# Patient Record
Sex: FEMALE | Race: WHITE | NOT HISPANIC OR LATINO | Employment: FULL TIME | URBAN - METROPOLITAN AREA
[De-identification: names, ages, dates, MRNs, and addresses within clinical notes are randomized per-mention and may not be internally consistent; named-entity substitution may affect disease eponyms.]

---

## 2017-01-02 ENCOUNTER — OFFICE VISIT (OUTPATIENT)
Dept: URGENT CARE | Facility: URGENT CARE | Age: 32
End: 2017-01-02
Payer: COMMERCIAL

## 2017-01-02 VITALS
OXYGEN SATURATION: 99 % | WEIGHT: 130 LBS | HEART RATE: 85 BPM | SYSTOLIC BLOOD PRESSURE: 111 MMHG | BODY MASS INDEX: 22.3 KG/M2 | TEMPERATURE: 99.7 F | DIASTOLIC BLOOD PRESSURE: 64 MMHG

## 2017-01-02 DIAGNOSIS — J02.0 STREP THROAT: ICD-10-CM

## 2017-01-02 DIAGNOSIS — R68.89 FEELS SICK: Primary | ICD-10-CM

## 2017-01-02 LAB
DEPRECATED S PYO AG THROAT QL EIA: ABNORMAL
FLUAV+FLUBV AG SPEC QL: NORMAL
FLUAV+FLUBV AG SPEC QL: NORMAL
MICRO REPORT STATUS: ABNORMAL
SPECIMEN SOURCE: ABNORMAL
SPECIMEN SOURCE: NORMAL

## 2017-01-02 PROCEDURE — 87880 STREP A ASSAY W/OPTIC: CPT | Performed by: FAMILY MEDICINE

## 2017-01-02 PROCEDURE — 87804 INFLUENZA ASSAY W/OPTIC: CPT | Performed by: FAMILY MEDICINE

## 2017-01-02 PROCEDURE — 99213 OFFICE O/P EST LOW 20 MIN: CPT | Performed by: FAMILY MEDICINE

## 2017-01-02 RX ORDER — AMOXICILLIN 500 MG/1
1000 CAPSULE ORAL 2 TIMES DAILY
Qty: 40 CAPSULE | Refills: 0 | Status: SHIPPED | OUTPATIENT
Start: 2017-01-02 | End: 2017-04-06

## 2017-01-02 NOTE — MR AVS SNAPSHOT
After Visit Summary   1/2/2017    Carrie Galdamez    MRN: [id number]           Patient Information     Date Of Birth          1985        Visit Information        Provider Department      1/2/2017 5:35 PM Ana Luisa Lucas MD [facility]        Today's Diagnoses     Feels sick    -  1     Strep throat            Follow-ups after your visit        Who to contact     If you have questions or need follow up information about today's clinic visit or your schedule please contact [facility] directly at [phone].  Normal or non-critical lab and imaging results will be communicated to you by [employer], letter or phone within 4 business days after the clinic has received the results. If you do not hear from us within 7 days, please contact the clinic through [employer] or phone. If you have a critical or abnormal lab result, we will notify you by phone as soon as possible.  Submit refill requests through [employer] or call your pharmacy and they will forward the refill request to us. Please allow 3 business days for your refill to be completed.          Additional Information About Your Visit        MyChart Information     [employer] gives you secure access to your electronic health record. If you see a primary care provider, you can also send messages to your care team and make appointments. If you have questions, please call your primary care clinic.  If you do not have a primary care provider, please call [phone] and they will assist you.         Blood Pressure from Last 3 Encounters:   10/10/16 129/74   09/21/16 117/72   08/08/16 130/82    Weight from Last 3 Encounters:   10/10/16 132 lb (59.875 kg)   09/21/16 136 lb (61.689 kg)   08/08/16 135 lb (61.236 kg)              We Performed the Following     Influenza A/B antigen     Rapid strep screen          Today's Medication Changes          These changes are accurate as of: 1/2/17  6:55 PM.  If you have any questions,  ask your nurse or doctor.               Start taking these medicines.        Dose/Directions    amoxicillin 500 MG capsule   Commonly known as:  AMOXIL   Used for:  Strep throat        Dose:  1000 mg   Take 2 capsules (1,000 mg) by mouth 2 times daily   Quantity:  40 capsule   Refills:  0            Where to get your medicines      These medications were sent to [id number] - [place] - [address], [place] [address]     Phone:  [phone]    - amoxicillin 500 MG capsule             Primary Care Provider Office Phone # Fax #    Rylie Wanda Oquendo, APRN [facility] [phone]       [address] [address]        Thank you!     [facility] you [facility]  [facility]. Our goal is always to provide you with excellent care. Hearing back from our patients is one way we can continue to improve our services. Please take a few minutes to complete the written survey that you may receive in the mail after your visit with us. Thank you!             Your Updated Medication List - Protect others around you: Learn how to safely use, store and throw away your medicines at www.disposemymeds.org.          This list is accurate as of: 1/2/17  6:55 PM.  Always use your most recent med list.                   Brand Name Dispense Instructions for use    amoxicillin 500 MG capsule    AMOXIL    40 capsule    Take 2 capsules (1,000 mg) by mouth 2 times daily       citalopram 20 MG tablet    celeXA    90 tablet    Take 1 tablet (20 mg) by mouth daily       fluticasone 50 MCG/ACT spray    FLONASE         methylphenidate 10 MG CR capsule    METADATE CD    30 capsule    Take 1 capsule (10 mg) by mouth every morning

## 2017-01-03 NOTE — PROGRESS NOTES
SUBJECTIVE:                                                    Carrie Galdamez is a 31 year old female who presents to clinic today for the following health issues:      Acute Illness   Acute illness concerns:    Onset:  4 p.m. Yesterday      Fever: YES    Chills/Sweats: YES    Headache (location?): no    Sinus Pressure:no    Conjunctivitis:  no    Ear Pain: yes. Left ear pain     Rhinorrhea: no    Congestion: no    Sore Throat: YES     Cough: no    Wheeze: no    Decreased Appetite:  yes    Nausea: no     Vomiting: no     Diarrhea:  no     Dysuria/Freq.: no     Fatigue/Achiness: YES    Sick/Strep Exposure: no      Therapies Tried and outcome: tylenol            Problem list and histories reviewed & adjusted, as indicated.  Additional history: as documented    Patient Active Problem List   Diagnosis     History of seizure disorder     CARDIOVASCULAR SCREENING; LDL GOAL LESS THAN 160     Generalized anxiety disorder     Recurrent HSV (herpes simplex virus)     Organic hypersomnia     Controlled substance agreement signed     Past Surgical History   Procedure Laterality Date     Pe tubes  3/1988       Social History   Substance Use Topics     Smoking status: Never Smoker      Smokeless tobacco: Never Used      Comment: lives in non smoking household     Alcohol Use: Yes     Family History   Problem Relation Age of Onset     DIABETES Maternal Grandmother      Glaucoma Maternal Grandmother      Cancer - colorectal Paternal Grandmother      CANCER Paternal Grandmother      GASTROINTESTINAL DISEASE Brother      CANCER Paternal Grandfather      Hypertension No family hx of      CEREBROVASCULAR DISEASE No family hx of      Thyroid Disease No family hx of      Macular Degeneration No family hx of      CANCER Maternal Uncle          BP Readings from Last 3 Encounters:   01/02/17 111/64   10/10/16 129/74   09/21/16 117/72    Wt Readings from Last 3 Encounters:   01/02/17 130 lb (58.968 kg)   10/10/16 132 lb (59.875  kg)   09/21/16 136 lb (61.689 kg)                  Allergies   Allergen Reactions     Ceclor [Cefaclor] Rash          Labs reviewed in EPIC  Problem list, Medication list, Allergies, and Medical/Social/Surgical histories reviewed in [facility] and updated as appropriate.    ROS:  This 31 year old female is here today with her . Patient developed severe sore throat about 4 p.m. Yesterday. Throat was much worse this morning. She has had fevers and chills and sweats today. Needs to get back to work tomorrow.  has the same symptoms.  All other review of systems are negative  Personal, family, and social history reviewed with patient and revised.         OBJECTIVE:                                                    /64 mmHg  Pulse 85  Temp(Src) 99.7  F (37.6  C) (Oral)  Wt 130 lb (58.968 kg)  SpO2 99%  Body mass index is 22.3 kg/(m^2).  GENERAL: she appears ill   EYES: Eyes grossly normal to inspection, PERRL and conjunctivae and sclerae normal  HENT: ear canals and TM's normal, nose and mouth without ulcers or lesions, but she has very large red tonsils with white exudates  NECK: tender and large anterior cervical lymph nodes  RESP: lungs clear to auscultation - no rales, rhonchi or wheezes  MS: no gross musculoskeletal defects noted, no edema    Diagnostic Test Results:  Results for orders placed or performed in visit on 01/02/17 (from the past 24 hour(s))   Rapid strep screen   Result Value Ref Range    Specimen Description Throat     Rapid Strep A Screen (A)      POSITIVE: Group A Streptococcal antigen detected by immunoassay.    Micro Report Status FINAL 01/02/2017    Influenza A/B antigen   Result Value Ref Range    Influenza A/B Agn Specimen Nasal     Influenza A  NEG     Negative   Test results must be correlated with clinical data. If necessary, results   should be confirmed by a molecular assay or viral culture.      Influenza B  NEG     Negative   Test results must be correlated with clinical  data. If necessary, results   should be confirmed by a molecular assay or viral culture.          ASSESSMENT/PLAN:                                                             1. Feels sick  As above   - Rapid strep screen  - Influenza A/B antigen    2. Strep throat  As above, she is allergic to ceclor but can tolerate amoxicillin.  was positive also   - amoxicillin (AMOXIL) 500 MG capsule; Take 2 capsules (1,000 mg) by mouth 2 times daily  Dispense: 40 capsule; Refill: 0    Return to clinic if no improvement     IVAN ADLER MD  [facility]

## 2017-01-03 NOTE — NURSING NOTE
"Chief Complaint   Patient presents with     URI       Initial /64 mmHg  Pulse 85  Temp(Src) 99.7  F (37.6  C) (Oral)  Wt 130 lb (58.968 kg)  SpO2 99% Estimated body mass index is 22.3 kg/(m^2) as calculated from the following:    Height as of 10/10/16: 5' 4\" (1.626 m).    Weight as of this encounter: 130 lb (58.968 kg)..  BP completed using cuff size: faye Brown LPN    "

## 2017-04-06 ENCOUNTER — OFFICE VISIT (OUTPATIENT)
Dept: SLEEP MEDICINE | Facility: CLINIC | Age: 32
End: 2017-04-06
Payer: COMMERCIAL

## 2017-04-06 VITALS
HEART RATE: 69 BPM | HEIGHT: 64 IN | SYSTOLIC BLOOD PRESSURE: 122 MMHG | OXYGEN SATURATION: 98 % | DIASTOLIC BLOOD PRESSURE: 79 MMHG | BODY MASS INDEX: 21.34 KG/M2 | WEIGHT: 125 LBS

## 2017-04-06 DIAGNOSIS — G47.10 ORGANIC HYPERSOMNIA: Primary | Chronic | ICD-10-CM

## 2017-04-06 PROCEDURE — 99214 OFFICE O/P EST MOD 30 MIN: CPT | Performed by: PHYSICIAN ASSISTANT

## 2017-04-06 RX ORDER — METHYLPHENIDATE HYDROCHLORIDE 18 MG/1
18 TABLET ORAL EVERY MORNING
Qty: 30 TABLET | Refills: 0 | Status: SHIPPED | OUTPATIENT
Start: 2017-04-20 | End: 2017-05-20

## 2017-04-06 NOTE — NURSING NOTE
"Chief Complaint   Patient presents with     RECHECK     med f/u       Initial There were no vitals taken for this visit. Estimated body mass index is 22.31 kg/(m^2) as calculated from the following:    Height as of 10/10/16: 1.626 m (5' 4\").    Weight as of 1/2/17: 59 kg (130 lb).  Medication Reconciliation: complete    "

## 2017-04-06 NOTE — PATIENT INSTRUCTIONS

## 2017-04-06 NOTE — PROGRESS NOTES
Medication Follow-Up Visit:    Chief Complaint   Patient presents with     RECHECK     med f/u       Carrie Galdamez comes in today for follow-up of idiopathic hypersomnia treated with Metadate CD 10 mg Q AM.     In review: PSG/MSLT was done on 7/3/2013 with the total sleep time was 381.0 minutes.  Sleep latency was normal at 17.3 minutes without the use of a sleep aid.  REM latency was 261.5 minutes.  Arousal index was increased at 37.6 arousals per hour.  Sleep efficiency was normal at 85.5%.  Wake after sleep onset was 47.0 minutes.  The patient spent 12.7% of total sleep time in Stage N1, 54.7% in Stage N2, 18.9% in Stages N3, and 13.6% in REM.  Time in REM supine was 0 minutes.  The combined apnea/hypopnea index was 0.3 events per hour.  The REM AHI was 0 events per hour.  The supine AHI was 0 events per hour.  The RDI was 0.3 events per hour. Following the overnight PSG, the patient underwent a series of 5 naps.  The latencies to the naps were 8.0 min, 20.0 min, 12.5 min, 4.0min, and 8.5 min, respectively.  This gives an average sleep latency of 10.6 minutes.  There were no sleep onset REM periods. During nap 2, patient stated that she could hear talking and doors opening and closing from wihin the sleep clinic.    She reports Methylphenidate helpful in reducing the the severity of her hypersomnolence, but no longer remedies the problem completely.     Patient is not having medication side effects.     During work week bedtime is typically 10 PM. Usually it takes about 15 minutes to fall asleep. Wake time is typically 5:45 AM.    On weekends bedtime is typically 10-11 PM . Usually it takes about 15 minutes to fall asleep. Wake time is typically 7 AM.      The patient is usually getting 8 hours of sleep per night.  Patient is napping.   Patient is using caffeine (3 cups)    Total score - [place]: 5 (4/6/2017 12:00 PM)      Past medical/surgical history, family history, social history, medications and  "allergies were reviewed.      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 Provider to review and confirm.       Recurrent HSV (herpes simplex virus) 01/02/2012     Priority: Medium     CARDIOVASCULAR SCREENING; LDL GOAL LESS THAN 160 10/31/2010     Priority: Medium     History of seizure disorder 06/09/2010     Priority: Medium     1997 unknown etiology.  Stopped dilantin 1999          /79  Pulse 69  Ht 1.626 m (5' 4\")  Wt 56.7 kg (125 lb)  SpO2 98%  BMI 21.46 kg/m2        Impression/Plan:  1. Idiopathic hypersomnia-  -  improvement EDS with Methylphenidate 10 mg taken early mornings, but does not remedy the problem completey.   -Trial of Concerta 18 mg CR Q AM. Side effects reviewed. Prescription for 30 tablets written today and will start after she runs out of her 10 mg prescription on 4/20/2017. If Concerta 18 mg works out, will give her 5 more refills.   -Follow up in about a year/sooner if questions/concerns.     Twenty-five minutes spent with patient, all of which were spent face-to-face counseling, consulting, coordinating plan of care regarding hypersomnia.      Krista Watson PA-C        CC:  Rylie Oquendo    "

## 2017-04-06 NOTE — MR AVS SNAPSHOT
After Visit Summary   4/6/2017    Carrie Galdamez    MRN: [id number]           Patient Information     Date Of Birth          1985        Visit Information        Provider Department      4/6/2017 1:00 PM Krista Watson PA [facility]        Today's Diagnoses     Organic hypersomnia    -  1      Care Instructions      Your BMI is Body mass index is 21.46 kg/(m^2).  Weight management is a personal decision.  If you are interested in exploring weight loss strategies, the following discussion covers the approaches that may be successful. Body mass index (BMI) is one way to tell whether you are at a healthy weight, overweight, or obese. It measures your weight in relation to your height.  A BMI of 18.5 to 24.9 is in the healthy range. A person with a BMI of 25 to 29.9 is considered overweight, and someone with a BMI of 30 or greater is considered obese. More than two-thirds of American adults are considered overweight or obese.  Being overweight or obese increases the risk for further weight gain. Excess weight may lead to heart disease and diabetes.  Creating and following plans for healthy eating and physical activity may help you improve your health.  Weight control is part of healthy lifestyle and includes exercise, emotional health, and healthy eating habits. Careful eating habits lifelong are the mainstay of weight control. Though there are significant health benefits from weight loss, long-term weight loss with diet alone may be very difficult to achieve- studies show long-term success with dietary management in less than 10% of people. Attaining a healthy weight may be especially difficult to achieve in those with severe obesity. In some cases, medications, devices and surgical management might be considered.  What can you do?  If you are overweight or obese and are interested in methods for weight loss, you should discuss this with your provider.     Consider reducing  daily calorie intake by 500 calories.     Keep a food journal.     Avoiding skipping meals, consider cutting portions instead.    Diet combined with exercise helps maintain muscle while optimizing fat loss. Strength training is particularly important for building and maintaining muscle mass. Exercise helps reduce stress, increase energy, and improves fitness. Increasing exercise without diet control, however, may not burn enough calories to loose weight.       Start walking three days a week 10-20 minutes at a time    Work towards walking thirty minutes five days a week     Eventually, increase the speed of your walking for 1-2 minutes at time    In addition, we recommend that you review healthy lifestyles and methods for weight loss available through the National Institutes of Health patient information sites:  http://win.niddk.nih.gov/publications/index.htm    And look into health and wellness programs that may be available through your health insurance provider, employer, local community center, or laura club.                Follow-ups after your visit        Follow-up notes from your care team     Return in about 1 year (around 4/6/2018).      Who to contact     If you have questions or need follow up information about today's clinic visit or your schedule please contact [facility] directly at [phone].  Normal or non-critical lab and imaging results will be communicated to you by MyChart, letter or phone within 4 business days after the clinic has received the results. If you do not hear from us within 7 days, please contact the clinic through MyChart or phone. If you have a critical or abnormal lab result, we will notify you by phone as soon as possible.  Submit refill requests through [employer] or call your pharmacy and they will forward the refill request to us. Please allow 3 business days for your refill to be completed.          Additional Information About Your Visit        MyChart  "Information     [employer] gives you secure access to your electronic health record. If you see a primary care provider, you can also send messages to your care team and make appointments. If you have questions, please call your primary care clinic.  If you do not have a primary care provider, please call [phone] and they will assist you.        Care EveryWhere ID     This is your Care EveryWhere ID. This could be used by other organizations to access your [place] medical records  [id number]        Your Vitals Were     Pulse Height Pulse Oximetry BMI (Body Mass Index)          69 1.626 m (5' 4\") 98% 21.46 kg/m2         Blood Pressure from Last 3 Encounters:   04/06/17 122/79   01/02/17 111/64   10/10/16 129/74    Weight from Last 3 Encounters:   04/06/17 56.7 kg (125 lb)   01/02/17 59 kg (130 lb)   10/10/16 59.9 kg (132 lb)              Today, you had the following     No orders found for display         Today's Medication Changes          These changes are accurate as of: 4/6/17  1:25 PM.  If you have any questions, ask your nurse or doctor.               These medicines have changed or have updated prescriptions.        Dose/Directions    * methylphenidate 10 MG CR capsule   Commonly known as:  METADATE CD   This may have changed:  Another medication with the same name was added. Make sure you understand how and when to take each.   Used for:  Idiopathic hypersomnia with long sleep time        Dose:  10 mg   Take 1 capsule (10 mg) by mouth every morning   Quantity:  30 capsule   Refills:  0       * methylphenidate ER 18 MG CR tablet   Commonly known as:  CONCERTA   This may have changed:  You were already taking a medication with the same name, and this prescription was added. Make sure you understand how and when to take each.        Dose:  18 mg   Start taking on:  4/20/2017   Take 1 tablet (18 mg) by mouth every morning   Quantity:  30 tablet   Refills:  0       * Notice:  This list has 2 medication(s) " that are the same as other medications prescribed for you. Read the directions carefully, and ask your doctor or other care provider to review them with you.         Where to get your medicines      Some of these will need a paper prescription and others can be bought over the counter.  Ask your nurse if you have questions.     Bring a paper prescription for each of these medications     methylphenidate ER 18 MG CR tablet                Primary Care Provider Office Phone # Fax #    Rylie Oquendo KANDACE [facility] [phone]       [facility] [address] [address]        Thank you!     Thank you for choosing [facility]  for your care. Our goal is always to provide you with excellent care. Hearing back from our patients is one way we can continue to improve our services. Please take a few minutes to complete the written survey that you may receive in the mail after your visit with us. Thank you!             Your Updated Medication List - Protect others around you: Learn how to safely use, store and throw away your medicines at www.disposemymeds.org.          This list is accurate as of: 4/6/17  1:25 PM.  Always use your most recent med list.                   Brand Name Dispense Instructions for use    citalopram 20 MG tablet    celeXA    90 tablet    Take 1 tablet (20 mg) by mouth daily       fluticasone 50 MCG/ACT spray    FLONASE         * methylphenidate 10 MG CR capsule    METADATE CD    30 capsule    Take 1 capsule (10 mg) by mouth every morning       * methylphenidate ER 18 MG CR tablet   Start taking on:  4/20/2017    CONCERTA    30 tablet    Take 1 tablet (18 mg) by mouth every morning       * Notice:  This list has 2 medication(s) that are the same as other medications prescribed for you. Read the directions carefully, and ask your doctor or other care provider to review them with you.

## 2017-05-30 ENCOUNTER — MYC MEDICAL ADVICE (OUTPATIENT)
Dept: SLEEP MEDICINE | Facility: CLINIC | Age: 32
End: 2017-05-30

## 2017-06-01 RX ORDER — METHYLPHENIDATE HYDROCHLORIDE 18 MG/1
18 TABLET ORAL DAILY
Qty: 30 TABLET | Refills: 0 | Status: SHIPPED | OUTPATIENT
Start: 2017-07-05 | End: 2017-09-15

## 2017-06-01 RX ORDER — METHYLPHENIDATE HYDROCHLORIDE 18 MG/1
18 TABLET ORAL DAILY
Qty: 30 TABLET | Refills: 0 | Status: SHIPPED | OUTPATIENT
Start: 2017-08-05 | End: 2017-09-15

## 2017-06-01 RX ORDER — METHYLPHENIDATE HYDROCHLORIDE 18 MG/1
18 TABLET ORAL EVERY MORNING
Qty: 30 TABLET | Refills: 0 | Status: CANCELLED | OUTPATIENT
Start: 2017-06-01

## 2017-06-01 RX ORDER — METHYLPHENIDATE HYDROCHLORIDE 18 MG/1
18 TABLET ORAL DAILY
Qty: 30 TABLET | Refills: 0 | Status: SHIPPED | OUTPATIENT
Start: 2017-06-05 | End: 2017-09-15

## 2017-06-01 NOTE — TELEPHONE ENCOUNTER
Prescription for 3 months signed. Per protocol, this medication can only be prescribed 3 months at a time. Follow up as planned.   Krista Watson PA-C

## 2017-06-01 NOTE — TELEPHONE ENCOUNTER
From: Carrie Galdamez  To: Krista Watson PA  Sent: 5/30/2017 8:48 PM CDT  Subject: Rx Refill on New Prescription    Hi Dr. Watson!     I'm responding well to the new Rx you had written out last month, to 18mg dose. Certainly feeling more alert throughout the day than the 10 mg dose. I have about a week's left and will need an updated Rx. Can you please write out a new Rx and have the nurse mail it to my home address? Not sure when I would be able to stop in the [facility] office next so mail will work out best. If possible the next 6mo would be great.     Thanks so much! Hope you enjoyed your Memorial Day Weekend!     Cheryl Galdamez             * methylphenidate ER 18 MG CR tablet  Commonly known as: CONCERTA  This may have changed: You were already taking a medication with the same name, and this prescription was added. Make sure you understand how and when to take each.        Dose: 18 mg  Start taking on: 4/20/2017  Take 1 tablet (18 mg) by mouth every morning  Quantity: 30 tablet  Refills: 0

## 2017-07-24 ENCOUNTER — OFFICE VISIT (OUTPATIENT)
Dept: FAMILY MEDICINE | Facility: CLINIC | Age: 32
End: 2017-07-24
Payer: COMMERCIAL

## 2017-07-24 VITALS
TEMPERATURE: 97.3 F | SYSTOLIC BLOOD PRESSURE: 114 MMHG | DIASTOLIC BLOOD PRESSURE: 72 MMHG | HEART RATE: 62 BPM | WEIGHT: 123 LBS | OXYGEN SATURATION: 100 % | BODY MASS INDEX: 21 KG/M2 | HEIGHT: 64 IN

## 2017-07-24 DIAGNOSIS — J30.2 ACUTE SEASONAL ALLERGIC RHINITIS, UNSPECIFIED TRIGGER: ICD-10-CM

## 2017-07-24 DIAGNOSIS — Z00.00 ROUTINE GENERAL MEDICAL EXAMINATION AT A HEALTH CARE FACILITY: Primary | ICD-10-CM

## 2017-07-24 DIAGNOSIS — H04.123 DRY EYES: ICD-10-CM

## 2017-07-24 DIAGNOSIS — Z12.4 SCREENING FOR CERVICAL CANCER: ICD-10-CM

## 2017-07-24 DIAGNOSIS — Z13.6 CARDIOVASCULAR SCREENING; LDL GOAL LESS THAN 160: Chronic | ICD-10-CM

## 2017-07-24 DIAGNOSIS — Z30.41 ENCOUNTER FOR SURVEILLANCE OF CONTRACEPTIVE PILLS: ICD-10-CM

## 2017-07-24 DIAGNOSIS — F41.1 GENERALIZED ANXIETY DISORDER: Chronic | ICD-10-CM

## 2017-07-24 LAB
BETA HCG QUAL IFA URINE: NEGATIVE
CHOLEST SERPL-MCNC: 196 MG/DL
HDLC SERPL-MCNC: 98 MG/DL
LDLC SERPL CALC-MCNC: 87 MG/DL
NONHDLC SERPL-MCNC: 98 MG/DL
TRIGL SERPL-MCNC: 53 MG/DL

## 2017-07-24 PROCEDURE — 84703 CHORIONIC GONADOTROPIN ASSAY: CPT | Performed by: NURSE PRACTITIONER

## 2017-07-24 PROCEDURE — G0145 SCR C/V CYTO,THINLAYER,RESCR: HCPCS | Performed by: NURSE PRACTITIONER

## 2017-07-24 PROCEDURE — 99395 PREV VISIT EST AGE 18-39: CPT | Performed by: NURSE PRACTITIONER

## 2017-07-24 PROCEDURE — 36415 COLL VENOUS BLD VENIPUNCTURE: CPT | Performed by: NURSE PRACTITIONER

## 2017-07-24 PROCEDURE — 87624 HPV HI-RISK TYP POOLED RSLT: CPT | Performed by: NURSE PRACTITIONER

## 2017-07-24 PROCEDURE — 80061 LIPID PANEL: CPT | Performed by: NURSE PRACTITIONER

## 2017-07-24 RX ORDER — CITALOPRAM HYDROBROMIDE 20 MG/1
20 TABLET ORAL DAILY
Qty: 90 TABLET | Refills: 3 | Status: SHIPPED | OUTPATIENT
Start: 2017-07-24 | End: 2018-11-07

## 2017-07-24 RX ORDER — DESOGESTREL AND ETHINYL ESTRADIOL 0.15-0.03
1 KIT ORAL DAILY
Qty: 84 TABLET | Refills: 3 | Status: SHIPPED | OUTPATIENT
Start: 2017-07-24 | End: 2018-11-07

## 2017-07-24 RX ORDER — FLUTICASONE PROPIONATE 50 MCG
2 SPRAY, SUSPENSION (ML) NASAL DAILY
Qty: 1 BOTTLE | Refills: 4 | Status: SHIPPED | OUTPATIENT
Start: 2017-07-24 | End: 2018-11-07

## 2017-07-24 ASSESSMENT — ANXIETY QUESTIONNAIRES
2. NOT BEING ABLE TO STOP OR CONTROL WORRYING: NOT AT ALL
GAD7 TOTAL SCORE: 4
6. BECOMING EASILY ANNOYED OR IRRITABLE: SEVERAL DAYS
5. BEING SO RESTLESS THAT IT IS HARD TO SIT STILL: NOT AT ALL
3. WORRYING TOO MUCH ABOUT DIFFERENT THINGS: SEVERAL DAYS
7. FEELING AFRAID AS IF SOMETHING AWFUL MIGHT HAPPEN: NOT AT ALL
IF YOU CHECKED OFF ANY PROBLEMS ON THIS QUESTIONNAIRE, HOW DIFFICULT HAVE THESE PROBLEMS MADE IT FOR YOU TO DO YOUR WORK, TAKE CARE OF THINGS AT HOME, OR GET ALONG WITH OTHER PEOPLE: NOT DIFFICULT AT ALL
1. FEELING NERVOUS, ANXIOUS, OR ON EDGE: SEVERAL DAYS

## 2017-07-24 ASSESSMENT — PATIENT HEALTH QUESTIONNAIRE - PHQ9: 5. POOR APPETITE OR OVEREATING: SEVERAL DAYS

## 2017-07-24 NOTE — NURSING NOTE
"Chief Complaint   Patient presents with     Physical       Initial /72 (BP Location: Right arm, Patient Position: Chair, Cuff Size: Adult Regular)  Pulse 62  Temp 97.3  F (36.3  C) (Oral)  Ht 5' 4\" (1.626 m)  Wt 123 lb (55.8 kg)  LMP 06/12/2017  SpO2 100%  BMI 21.11 kg/m2 Estimated body mass index is 21.11 kg/(m^2) as calculated from the following:    Height as of this encounter: 5' 4\" (1.626 m).    Weight as of this encounter: 123 lb (55.8 kg).  Medication Reconciliation: complete    "

## 2017-07-24 NOTE — MR AVS SNAPSHOT
After Visit Summary   7/24/2017    Carrie Galdamez    MRN: [id number]           Patient Information     Date Of Birth          1985        Visit Information        Provider Department      7/24/2017 7:40 AM Rylie Oquendo APRN [facility]        Today's Diagnoses     Routine general medical examination at a health care facility    -  1    Generalized anxiety disorder        Dry eyes        Acute seasonal allergic rhinitis, unspecified trigger        CARDIOVASCULAR SCREENING; LDL GOAL LESS THAN 160        Encounter for surveillance of contraceptive pills        Screening for cervical cancer          Care Instructions    Liquid tears at nighttime  During the day use the rewetting drops  Try Alaway (Ketotfen is the generic) eye drops twice daily    Preventive Health Recommendations  Female Ages 26 - 39  Yearly exam:   See your health care provider every year in order to    Review health changes.     Discuss preventive care.      Review your medicines if you your doctor has prescribed any.    Until age 30: Get a Pap test every three years (more often if you have had an abnormal result).    After age 30: Talk to your doctor about whether you should have a Pap test every 3 years or have a Pap test with HPV screening every 5 years.   You do not need a Pap test if your uterus was removed (hysterectomy) and you have not had cancer.  You should be tested each year for STDs (sexually transmitted diseases), if you're at risk.   Talk to your provider about how often to have your cholesterol checked.  If you are at risk for diabetes, you should have a diabetes test (fasting glucose).  Shots: Get a flu shot each year. Get a tetanus shot every 10 years.   Nutrition:     Eat at least 5 servings of fruits and vegetables each day.    Eat whole-grain bread, whole-wheat pasta and brown rice instead of white grains and rice.    Talk to your provider about Calcium and Vitamin D.      Lifestyle    Exercise at least 150 minutes a week (30 minutes a day, 5 days of the week). This will help you control your weight and prevent disease.    Limit alcohol to one drink per day.    No smoking.     Wear sunscreen to prevent skin cancer.    See your dentist every six months for an exam and cleaning.    [facility]    If you have any questions regarding to your visit please contact your care team:       Team Red:   Clinic Hours Telephone Number   Dr. Lora Oquendo, NP   7am-7pm  Monday - Thursday   7am-5pm  Fridays  [phone]  (Appointment scheduling available 24/7)    Questions about your visit?   Team Line  [phone]   Urgent Care - [place] and [facility] - 11am-9pm Monday-Friday Saturday-Sunday- 9am-5pm   [place] - 5pm-9pm Monday-Friday Saturday-Sunday- 9am-5pm  [phone] - [place]   [phone] - [place]       What options do I have for visits at the clinic other than the traditional office visit?  To expand how we care for you, many of our providers are utilizing electronic visits (e-visits) and telephone visits, when medically appropriate, for interactions with their patients rather than a visit in the clinic.   We also offer nurse visits for many medical concerns. Just like any other service, we will bill your insurance company for this type of visit based on time spent on the phone with your provider. Not all insurance companies cover these visits. Please check with your medical insurance if this type of visit is covered. You will be responsible for any charges that are not paid by your insurance.      E-visits via [employer]:  generally incur a $35.00 fee.  Telephone visits:  Time spent on the phone: *charged based on time that is spent on the phone in increments of 10 minutes. Estimated cost:   5-10 mins $30.00   11-20 mins. $59.00   21-30 mins. $85.00     Use [employer] (secure email communication and  "access to your chart) to send your primary care provider a message or make an appointment. Ask someone on your Team how to sign up for [employer].  For a Price Quote for your services, please call our [employer] Price Line at [phone].      As always, Thank you for trusting us with your health care needs!    Discharged by Jessica Mahmood MA.            Follow-ups after your visit        Who to contact     If you have questions or need follow up information about today's clinic visit or your schedule please contact [facility] directly at [phone].  Normal or non-critical lab and imaging results will be communicated to you by [employer], letter or phone within 4 business days after the clinic has received the results. If you do not hear from us within 7 days, please contact the clinic through Indotradingt or phone. If you have a critical or abnormal lab result, we will notify you by phone as soon as possible.  Submit refill requests through Planbox or call your pharmacy and they will forward the refill request to us. Please allow 3 business days for your refill to be completed.          Additional Information About Your Visit        [employer] Information     Planbox gives you secure access to your electronic health record. If you see a primary care provider, you can also send messages to your care team and make appointments. If you have questions, please call your primary care clinic.  If you do not have a primary care provider, please call [phone] and they will assist you.        Care EveryWhere ID     This is your Care EveryWhere ID. This could be used by other organizations to access your [place] medical records  [id number]        Your Vitals Were     Pulse Temperature Height Last Period Pulse Oximetry BMI (Body Mass Index)    62 97.3  F (36.3  C) (Oral) 5' 4\" (1.626 m) 06/12/2017 100% 21.11 kg/m2       Blood Pressure from Last 3 Encounters:   07/24/17 114/72   04/06/17 122/79   01/02/17 111/64    " Weight from Last 3 Encounters:   07/24/17 123 lb (55.8 kg)   04/06/17 125 lb (56.7 kg)   01/02/17 130 lb (59 kg)              We Performed the Following     Beta HCG qual IFA urine     HPV High Risk Types DNA Cervical     Lipid panel reflex to direct LDL     Pap imaged thin layer screen with HPV - recommended age 30 - 65 years (select HPV order below)          Today's Medication Changes          These changes are accurate as of: 7/24/17  8:39 AM.  If you have any questions, ask your nurse or doctor.               Start taking these medicines.        Dose/Directions    desogestrel-ethinyl estradiol 0.15-30 MG-MCG per tablet   Commonly known as:  APRI   Used for:  Encounter for surveillance of contraceptive pills   Started by:  Rylie Oquendo APRN CNP        Dose:  1 tablet   Take 1 tablet by mouth daily   Quantity:  84 tablet   Refills:  3         These medicines have changed or have updated prescriptions.        Dose/Directions    fluticasone 50 MCG/ACT spray   Commonly known as:  FLONASE   This may have changed:    - how much to take  - how to take this  - when to take this   Used for:  Acute seasonal allergic rhinitis, unspecified trigger   Changed by:  Rylie Oquendo APRN CNP        Dose:  2 spray   Spray 2 sprays into both nostrils daily   Quantity:  1 Bottle   Refills:  4            Where to get your medicines      These medications were sent to [facility] [address] [place] [address] [place], [place] - [address] [facility] [address]  [address] [facility] [address]     Phone:  [phone]     citalopram 20 MG tablet    desogestrel-ethinyl estradiol 0.15-30 MG-MCG per tablet    fluticasone 50 MCG/ACT spray                Primary Care Provider Office Phone # Fax #    KANDACE Choi [phone]       [address] [address]        Equal Access to Services     EZE RESTREPO AH: lauren Garcia  naziaberkley max ocasio ah. So [facility] [phone].    ATENCIÓN: Si aron calle, tiene a sheffield disposición servicios gratuitos de asistencia lingüística. Michelle al [phone].    We comply with applicable federal civil rights laws and [place] laws. We do not discriminate on the basis of race, color, national origin, age, disability sex, sexual orientation or gender identity.            Thank you!     Thank you for choosing [facility]  for your care. Our goal is always to provide you with excellent care. Hearing back from our patients is one way we can continue to improve our services. Please take a few minutes to complete the written survey that you may receive in the mail after your visit with us. Thank you!             Your Updated Medication List - Protect others around you: Learn how to safely use, store and throw away your medicines at www.disposemymeds.org.          This list is accurate as of: 7/24/17  8:39 AM.  Always use your most recent med list.                   Brand Name Dispense Instructions for use Diagnosis    citalopram 20 MG tablet    celeXA    90 tablet    Take 1 tablet (20 mg) by mouth daily    Generalized anxiety disorder       desogestrel-ethinyl estradiol 0.15-30 MG-MCG per tablet    APRI    84 tablet    Take 1 tablet by mouth daily    Encounter for surveillance of contraceptive pills       fluticasone 50 MCG/ACT spray    FLONASE    1 Bottle    Spray 2 sprays into both nostrils daily    Acute seasonal allergic rhinitis, unspecified trigger       * methylphenidate ER 18 MG CR tablet    CONCERTA    30 tablet    Take 1 tablet (18 mg) by mouth daily        * methylphenidate ER 18 MG CR tablet   Start taking on:  8/5/2017    CONCERTA    30 tablet    Take 1 tablet (18 mg) by mouth daily        * Notice:  This list has 2 medication(s) that are the same as other medications prescribed for you. Read the directions carefully, and ask  your doctor or other care provider to review them with you.

## 2017-07-24 NOTE — PATIENT INSTRUCTIONS
Liquid tears at nighttime  During the day use the rewetting drops  Try Alaway (Ketotfen is the generic) eye drops twice daily    Preventive Health Recommendations  Female Ages 26 - 39  Yearly exam:   See your health care provider every year in order to    Review health changes.     Discuss preventive care.      Review your medicines if you your doctor has prescribed any.    Until age 30: Get a Pap test every three years (more often if you have had an abnormal result).    After age 30: Talk to your doctor about whether you should have a Pap test every 3 years or have a Pap test with HPV screening every 5 years.   You do not need a Pap test if your uterus was removed (hysterectomy) and you have not had cancer.  You should be tested each year for STDs (sexually transmitted diseases), if you're at risk.   Talk to your provider about how often to have your cholesterol checked.  If you are at risk for diabetes, you should have a diabetes test (fasting glucose).  Shots: Get a flu shot each year. Get a tetanus shot every 10 years.   Nutrition:     Eat at least 5 servings of fruits and vegetables each day.    Eat whole-grain bread, whole-wheat pasta and brown rice instead of white grains and rice.    Talk to your provider about Calcium and Vitamin D.     Lifestyle    Exercise at least 150 minutes a week (30 minutes a day, 5 days of the week). This will help you control your weight and prevent disease.    Limit alcohol to one drink per day.    No smoking.     Wear sunscreen to prevent skin cancer.    See your dentist every six months for an exam and cleaning.    [facility]    If you have any questions regarding to your visit please contact your care team:       Team Red:   Clinic Hours Telephone Number   Dr. Lora Oquendo, NP   7am-7pm  Monday - Thursday   7am-5pm  Fridays  [phone]  (Appointment scheduling available 24/7)    Questions about your visit?    Team Line  [phone]   Urgent Care - [place] - 11am-9pm Monday-Friday Saturday-Sunday- 9am-5pm   [place] - 5pm-9pm Monday-Friday Saturday-Sunday- 9am-5pm  [phone] - [place]   [phone] - [place]       What options do I have for visits at the clinic other than the traditional office visit?  To expand how we care for you, many of our providers are utilizing electronic visits (e-visits) and telephone visits, when medically appropriate, for interactions with their patients rather than a visit in the clinic.   We also offer nurse visits for many medical concerns. Just like any other service, we will bill your insurance company for this type of visit based on time spent on the phone with your provider. Not all insurance companies cover these visits. Please check with your medical insurance if this type of visit is covered. You will be responsible for any charges that are not paid by your insurance.      E-visits via [employer]:  generally incur a $35.00 fee.  Telephone visits:  Time spent on the phone: *charged based on time that is spent on the phone in increments of 10 minutes. Estimated cost:   5-10 mins $30.00   11-20 mins. $59.00   21-30 mins. $85.00     Use [email or web address] (secure email communication and access to your chart) to send your primary care provider a message or make an appointment. Ask someone on your Team how to sign up for [employer].  For a Price Quote for your services, please call our Consumer Price Line at [phone].      As always, Thank you for trusting us with your health care needs!    Discharged by Jessica Mahmood MA.

## 2017-07-24 NOTE — PROGRESS NOTES
SUBJECTIVE:   CC: Carrie Galdamez is an 32 year old woman who presents for preventive health visit.     Healthy Habits:    Do you get at least three servings of calcium containing foods daily (dairy, green leafy vegetables, etc.)? yes    Amount of exercise or daily activities, outside of work: 5 day(s) per week    Problems taking medications regularly No    Medication side effects: No    Have you had an eye exam in the past two years? yes    Do you see a dentist twice per year? yes    Do you have sleep apnea, excessive snoring or daytime drowsiness?no          Today's PHQ-2 Score: PHQ-2 ( 1999 [employer]) 7/24/2017 4/13/2016   Q1: Little interest or pleasure in doing things 0 0   Q2: Feeling down, depressed or hopeless 0 0   PHQ-2 Score 0 0         Abuse: Current or Past(Physical, Sexual or Emotional)- No  Do you feel safe in your environment - Yes  Social History   Substance Use Topics     Smoking status: Never Smoker     Smokeless tobacco: Never Used      Comment: lives in non smoking household     Alcohol use Yes     The patient does not drink >3 drinks per day nor >7 drinks per week.    Reviewed orders with patient.  Reviewed health maintenance and updated orders accordingly - Yes  BP Readings from Last 3 Encounters:   07/24/17 114/72   04/06/17 122/79   01/02/17 111/64    Wt Readings from Last 3 Encounters:   07/24/17 123 lb (55.8 kg)   04/06/17 125 lb (56.7 kg)   01/02/17 130 lb (59 kg)                      Mammogram not appropriate for this patient based on age.    Pertinent mammograms are reviewed under the imaging tab.  History of abnormal Pap smear: NO - age 30-65 PAP every 5 years with negative HPV co-testing recommended    Reviewed and updated as needed this visit by clinical staffTobacco  Allergies  Meds  Med Hx  Surg Hx  Fam Hx  Soc Hx        Reviewed and updated as needed this visit by Provider              ROS:  C: NEGATIVE for fever, chills, change in weight  I: NEGATIVE for worrisome  "rashes, moles or lesions  EYES: Dry eyes over the past 6-12 months, does wear contacts and uses rewetting drops. Has some occasional itchy, gritty eyes from allergies but usually only bothersome spring and fall  ENT: NEGATIVE for ear, mouth and throat problems  R: NEGATIVE for significant cough or SOB  B: NEGATIVE for masses, tenderness or discharge  CV: NEGATIVE for chest pain, palpitations or peripheral edema  GI: NEGATIVE for nausea, abdominal pain, heartburn, or change in bowel habits  : NEGATIVE for unusual urinary or vaginal symptoms. Periods are regular. Has been off contraception- was considering pregnancy but now has decided to restart her pills.  M: NEGATIVE for significant arthralgias or myalgia  N: NEGATIVE for weakness, dizziness or paresthesias  P: NEGATIVE for changes in mood or affect    OBJECTIVE:   /72 (BP Location: Right arm, Patient Position: Chair, Cuff Size: Adult Regular)  Pulse 62  Temp 97.3  F (36.3  C) (Oral)  Ht 5' 4\" (1.626 m)  Wt 123 lb (55.8 kg)  LMP 06/12/2017  SpO2 100%  BMI 21.11 kg/m2  EXAM:  GENERAL: healthy, alert and no distress  EYES: Eyes grossly normal to inspection, PERRL and conjunctivae and sclerae normal  HENT: ear canals and TM's normal, nose and mouth without ulcers or lesions  NECK: no adenopathy, no asymmetry, masses, or scars and thyroid normal to palpation  RESP: lungs clear to auscultation - no rales, rhonchi or wheezes  BREAST: normal without masses, tenderness or nipple discharge and no palpable axillary masses or adenopathy  CV: regular rate and rhythm, normal S1 S2, no S3 or S4, no murmur, click or rub, no peripheral edema and peripheral pulses strong  ABDOMEN: soft, nontender, no hepatosplenomegaly, no masses and bowel sounds normal  MS: no gross musculoskeletal defects noted, no edema  SKIN: no suspicious lesions or rashes  NEURO: Normal strength and tone, mentation intact and speech normal  PSYCH: mentation appears normal, affect " "normal/bright    ASSESSMENT/PLAN:   1. Routine general medical examination at a health care facility      2. Generalized anxiety disorder  Stable, continue Celexa without change.  - citalopram (CELEXA) 20 MG tablet; Take 1 tablet (20 mg) by mouth daily  Dispense: 90 tablet; Refill: 3    3. Dry eyes  See patient instructions    4. Acute seasonal allergic rhinitis, unspecified trigger  Continue Flonase seasonally  - fluticasone (FLONASE) 50 MCG/ACT spray; Spray 2 sprays into both nostrils daily  Dispense: 1 Bottle; Refill: 4    5. CARDIOVASCULAR SCREENING; LDL GOAL LESS THAN 160    - Lipid panel reflex to direct LDL    6. Encounter for surveillance of contraceptive pills  Either wait until next menses, otherwise wait 1 week then recheck a home pregnancy test and may start if test is negative.  - desogestrel-ethinyl estradiol (APRI) 0.15-30 MG-MCG per tablet; Take 1 tablet by mouth daily  Dispense: 84 tablet; Refill: 3  - Beta HCG qual IFA urine    7. Screening for cervical cancer    - Pap imaged thin layer screen with HPV - recommended age 30 - 65 years (select HPV order below)  - HPV High Risk Types DNA Cervical    COUNSELING:   Reviewed preventive health counseling, as reflected in patient instructions       Regular exercise       Healthy diet/nutrition       Contraception       Family planning       Folic Acid Counseling         reports that she has never smoked. She has never used smokeless tobacco.    Estimated body mass index is 21.11 kg/(m^2) as calculated from the following:    Height as of this encounter: 5' 4\" (1.626 m).    Weight as of this encounter: 123 lb (55.8 kg).         Counseling Resources:  ATP IV Guidelines  Pooled Cohorts Equation Calculator  Breast Cancer Risk Calculator  FRAX Risk Assessment  ICSI Preventive Guidelines  Dietary Guidelines for Americans, 2010  [employer]'s MyPlate  ASA Prophylaxis  Lung CA Screening    KANDACE Choi [facility]  "

## 2017-07-25 ASSESSMENT — PATIENT HEALTH QUESTIONNAIRE - PHQ9: SUM OF ALL RESPONSES TO PHQ QUESTIONS 1-9: 0

## 2017-07-25 ASSESSMENT — ANXIETY QUESTIONNAIRES: GAD7 TOTAL SCORE: 4

## 2017-07-26 LAB
COPATH REPORT: NORMAL
PAP: NORMAL

## 2017-07-27 LAB
FINAL DIAGNOSIS: NORMAL
HPV HR 12 DNA CVX QL NAA+PROBE: NEGATIVE
HPV16 DNA SPEC QL NAA+PROBE: NEGATIVE
HPV18 DNA SPEC QL NAA+PROBE: NEGATIVE
SPECIMEN DESCRIPTION: NORMAL

## 2017-09-15 ENCOUNTER — MYC MEDICAL ADVICE (OUTPATIENT)
Dept: SLEEP MEDICINE | Facility: CLINIC | Age: 32
End: 2017-09-15

## 2017-09-15 DIAGNOSIS — G47.10 ORGANIC HYPERSOMNIA: Primary | Chronic | ICD-10-CM

## 2017-09-15 RX ORDER — METHYLPHENIDATE HYDROCHLORIDE 18 MG/1
18 TABLET ORAL DAILY
Qty: 30 TABLET | Refills: 0 | Status: SHIPPED | OUTPATIENT
Start: 2017-11-15 | End: 2018-01-16

## 2017-09-15 RX ORDER — METHYLPHENIDATE HYDROCHLORIDE 18 MG/1
18 TABLET ORAL DAILY
Qty: 30 TABLET | Refills: 0 | Status: SHIPPED | OUTPATIENT
Start: 2017-09-15 | End: 2018-01-15

## 2017-09-15 RX ORDER — METHYLPHENIDATE HYDROCHLORIDE 18 MG/1
18 TABLET ORAL DAILY
Qty: 30 TABLET | Refills: 0 | Status: SHIPPED | OUTPATIENT
Start: 2017-10-15 | End: 2018-01-16

## 2017-09-15 NOTE — TELEPHONE ENCOUNTER
[employer] message sent to patient to notify her that there are 3 months Rx here for her to  and to be sure to bring her ID with her.    Rx x3 have been placed in the black binder.

## 2017-09-15 NOTE — TELEPHONE ENCOUNTER
From: Carrie Galdamez  To: Krista Watson PA  Sent: 9/15/2017 8:14 AM CDT  Subject: Question about medications    Hi Dr. Watson!    I need to renew my current Rx as I'm coming up on the last week of medication. I do not see it here listed under current medications. I am currently taking Methyphenidate 18MG Er tablets. Can you please write a new Rx for 6 months? I think that is what we've done before and then have our annual follow up visit.     Thank you so much, have a nice day!     Cheryl

## 2017-09-15 NOTE — TELEPHONE ENCOUNTER
Pending Prescriptions:                       Disp   Refills    methylphenidate ER (CONCERTA) 18 MG CR ta*30 tab*0            Sig: Take 1 tablet (18 mg) by mouth daily    methylphenidate ER (CONCERTA) 18 MG CR ta*30 tab*0            Sig: Take 1 tablet (18 mg) by mouth daily    methylphenidate ER (CONCERTA) 18 MG CR ta*30 tab*0            Sig: Take 1 tablet (18 mg) by mouth daily         Last Written Prescription Date:  08/05/2017  Last Fill Quantity: 30 per 30 days,   # refills: 0  Last Office Visit with prescribing provider: 04/06/2017  Future Office visit: one year recommended       Routing refill request to provider for review/approval because:  Drug not on the [facility] or [facility] refill protocol or controlled substance      Can only pend three months for scheduled and federally regulated medications. Please have patient call us when she fills her November medication and we will initiate the following 3 months scripts.  (I confirmed this information with our pharmacy)

## 2017-11-24 ENCOUNTER — ALLIED HEALTH/NURSE VISIT (OUTPATIENT)
Dept: NURSING | Facility: CLINIC | Age: 32
End: 2017-11-24
Payer: COMMERCIAL

## 2017-11-24 DIAGNOSIS — Z23 NEED FOR PROPHYLACTIC VACCINATION AND INOCULATION AGAINST INFLUENZA: Primary | ICD-10-CM

## 2017-11-24 PROCEDURE — 99207 ZZC NO CHARGE NURSE ONLY: CPT

## 2017-11-24 PROCEDURE — 90686 IIV4 VACC NO PRSV 0.5 ML IM: CPT

## 2017-11-24 PROCEDURE — 90471 IMMUNIZATION ADMIN: CPT

## 2017-11-24 NOTE — PROGRESS NOTES

## 2017-11-24 NOTE — MR AVS SNAPSHOT
After Visit Summary   11/24/2017    Carrie Galdamez    MRN: [id number]           Patient Information     Date Of Birth          1985        Visit Information        Provider Department      11/24/2017 9:15 AM BE ANCILLARY [facility]        Today's Diagnoses     Need for prophylactic vaccination and inoculation against influenza    -  1       Follow-ups after your visit        Who to contact     If you have questions or need follow up information about today's clinic visit or your schedule please contact [facility] directly at [phone].  Normal or non-critical lab and imaging results will be communicated to you by [employer], letter or phone within 4 business days after the clinic has received the results. If you do not hear from us within 7 days, please contact the clinic through [employer] or phone. If you have a critical or abnormal lab result, we will notify you by phone as soon as possible.  Submit refill requests through [employer] or call your pharmacy and they will forward the refill request to us. Please allow 3 business days for your refill to be completed.          Additional Information About Your Visit        MyChart Information     [employer] gives you secure access to your electronic health record. If you see a primary care provider, you can also send messages to your care team and make appointments. If you have questions, please call your primary care clinic.  If you do not have a primary care provider, please call [phone] and they will assist you.        Care EveryWhere ID     This is your Care EveryWhere ID. This could be used by other organizations to access your [place] medical records  [id number]         Blood Pressure from Last 3 Encounters:   07/24/17 114/72   04/06/17 122/79   01/02/17 111/64    Weight from Last 3 Encounters:   07/24/17 123 lb (55.8 kg)   04/06/17 125 lb (56.7 kg)   01/02/17 130 lb (59 kg)              We Performed the  Following     FLU VAC, SPLIT VIRUS IM > 3 YO (QUADRIVALENT) [77946]     Vaccine Administration, Initial [[id number]]        Primary Care Provider Office Phone # Fax #    KANDACE Choi KATHRYN [phone] [address] [facility] [place] [place]  [place] [address]        Equal Access to Services     GINO RESTREPO : Hadii aad ku hadasho Soomaali, waaxda luqadaha, qaybta kaalmada adeegyada, waxay idiin hayaan adeeg kharash laShahidaan . So [facility] [phone].    ATENCIÓN: Si habla español, tiene a sheffield disposición servicios gratuitos de asistencia lingüística. [facility] [phone].    We comply with applicable federal civil rights laws and [place] laws. We do not discriminate on the basis of race, color, national origin, age, disability, sex, sexual orientation, or gender identity.            Thank you!     Thank you for choosing [facility]  for your care. Our goal is always to provide you with excellent care. Hearing back from our patients is one way we can continue to improve our services. Please take a few minutes to complete the written survey that you may receive in the mail after your visit with us. Thank you!             Your Updated Medication List - Protect others around you: Learn how to safely use, store and throw away your medicines at www.disposemymeds.org.          This list is accurate as of: 11/24/17  9:50 AM.  Always use your most recent med list.                   Brand Name Dispense Instructions for use Diagnosis    citalopram 20 MG tablet    celeXA    90 tablet    Take 1 tablet (20 mg) by mouth daily    Generalized anxiety disorder       desogestrel-ethinyl estradiol 0.15-30 MG-MCG per tablet    APRI    84 tablet    Take 1 tablet by mouth daily    Encounter for surveillance of contraceptive pills       fluticasone 50 MCG/ACT spray    FLONASE    1 Bottle    Spray 2 sprays into both nostrils daily    Acute seasonal allergic rhinitis, unspecified trigger       * methylphenidate ER 18 MG  CR tablet    CONCERTA    30 tablet    Take 1 tablet (18 mg) by mouth daily    Organic hypersomnia       * methylphenidate ER 18 MG CR tablet    CONCERTA    30 tablet    Take 1 tablet (18 mg) by mouth daily    Organic hypersomnia       * methylphenidate ER 18 MG CR tablet    CONCERTA    30 tablet    Take 1 tablet (18 mg) by mouth daily    Organic hypersomnia       * Notice:  This list has 3 medication(s) that are the same as other medications prescribed for you. Read the directions carefully, and ask your doctor or other care provider to review them with you.

## 2018-01-15 ENCOUNTER — MYC REFILL (OUTPATIENT)
Dept: SLEEP MEDICINE | Facility: CLINIC | Age: 33
End: 2018-01-15

## 2018-01-15 DIAGNOSIS — G47.10 ORGANIC HYPERSOMNIA: Chronic | ICD-10-CM

## 2018-01-16 NOTE — TELEPHONE ENCOUNTER
Message from [email or web address]:  Original authorizing provider: MARIAMA Wood would like a refill of the following medications:  methylphenidate ER (CONCERTA) 18 MG CR tablet [MARIAMA Wood]    Preferred pharmacy: [facility] [address] [place] [address], [place] [facility] [address] [facility] [address] [address] [address]    Comment:

## 2018-01-18 RX ORDER — METHYLPHENIDATE HYDROCHLORIDE 18 MG/1
18 TABLET ORAL DAILY
Qty: 30 TABLET | Refills: 0 | Status: SHIPPED | OUTPATIENT
Start: 2018-02-15 | End: 2018-07-03

## 2018-01-18 RX ORDER — METHYLPHENIDATE HYDROCHLORIDE 18 MG/1
18 TABLET ORAL DAILY
Qty: 30 TABLET | Refills: 0 | Status: SHIPPED | OUTPATIENT
Start: 2018-01-18 | End: 2018-07-03

## 2018-01-18 RX ORDER — METHYLPHENIDATE HYDROCHLORIDE 18 MG/1
18 TABLET ORAL DAILY
Qty: 30 TABLET | Refills: 0 | Status: SHIPPED | OUTPATIENT
Start: 2018-03-17 | End: 2018-07-03

## 2018-01-18 NOTE — TELEPHONE ENCOUNTER
[employer] message sent to patient to let her know that her Rx's are ready to be picked up.  3 Concerta Rx's have been placed in the black binder for patient .

## 2018-07-02 ENCOUNTER — MYC MEDICAL ADVICE (OUTPATIENT)
Dept: SLEEP MEDICINE | Facility: CLINIC | Age: 33
End: 2018-07-02

## 2018-07-02 DIAGNOSIS — G47.10 ORGANIC HYPERSOMNIA: Chronic | ICD-10-CM

## 2018-07-03 NOTE — TELEPHONE ENCOUNTER
LVM for pt in regards to her med refill. Letting her know MARIAMA Alonso is in [facility] today and he can have the refills ready Thursday morning when he is back in [place].

## 2018-07-03 NOTE — TELEPHONE ENCOUNTER
From: Carrie Galdamez  To: Krista Watson PA  Sent: 7/2/2018 2:55 PM CDT  Subject: Question about medications    Hello Doctor Krista,     Can you please re issue a 3 month supply of the current medication I am on? Methylphenidate 18MG ER Tablets. Thank you!!     ~Cheryl

## 2018-07-03 NOTE — TELEPHONE ENCOUNTER
methyphenadate er 18mg      Last Written Prescription Date:  03/17/18  Last Fill Quantity: 30,   # refills: 0  Last Office Visit: 04/16/17  Future Office visit:     Cindy   Pt needs to schedule appt  Routing refill request to provider for review/approval because:  Medication is reported/historical  Pending Prescriptions:                       Disp   Refills    methylphenidate ER (CONCERTA) 18 MG CR ta*30 tab*0            Sig: Take 1 tablet (18 mg) by mouth daily    methylphenidate ER (CONCERTA) 18 MG CR ta*30 tab*0            Sig: Take 1 tablet (18 mg) by mouth daily    methylphenidate ER (CONCERTA) 18 MG CR ta*30 tab*0            Sig: Take 1 tablet (18 mg) by mouth daily    [facility] 7/3/2018 8:47 AM

## 2018-07-05 RX ORDER — METHYLPHENIDATE HYDROCHLORIDE 18 MG/1
18 TABLET ORAL DAILY
Qty: 30 TABLET | Refills: 0 | Status: SHIPPED | OUTPATIENT
Start: 2018-08-05 | End: 2018-11-30

## 2018-07-05 RX ORDER — METHYLPHENIDATE HYDROCHLORIDE 18 MG/1
18 TABLET ORAL DAILY
Qty: 30 TABLET | Refills: 0 | Status: SHIPPED | OUTPATIENT
Start: 2018-09-05 | End: 2021-02-17

## 2018-07-05 RX ORDER — METHYLPHENIDATE HYDROCHLORIDE 18 MG/1
18 TABLET ORAL DAILY
Qty: 30 TABLET | Refills: 0 | Status: SHIPPED | OUTPATIENT
Start: 2018-07-05 | End: 2018-07-05

## 2018-07-05 RX ORDER — METHYLPHENIDATE HYDROCHLORIDE 18 MG/1
18 TABLET ORAL DAILY
Qty: 30 TABLET | Refills: 0 | Status: SHIPPED | OUTPATIENT
Start: 2018-07-05 | End: 2021-02-17

## 2018-11-05 DIAGNOSIS — F41.1 GENERALIZED ANXIETY DISORDER: Chronic | ICD-10-CM

## 2018-11-07 ENCOUNTER — OFFICE VISIT (OUTPATIENT)
Dept: FAMILY MEDICINE | Facility: CLINIC | Age: 33
End: 2018-11-07
Payer: COMMERCIAL

## 2018-11-07 VITALS
TEMPERATURE: 99 F | HEIGHT: 65 IN | DIASTOLIC BLOOD PRESSURE: 70 MMHG | HEART RATE: 90 BPM | OXYGEN SATURATION: 100 % | BODY MASS INDEX: 22.33 KG/M2 | WEIGHT: 134 LBS | SYSTOLIC BLOOD PRESSURE: 114 MMHG

## 2018-11-07 DIAGNOSIS — F41.1 GENERALIZED ANXIETY DISORDER: Chronic | ICD-10-CM

## 2018-11-07 DIAGNOSIS — Z23 NEED FOR PROPHYLACTIC VACCINATION AND INOCULATION AGAINST INFLUENZA: ICD-10-CM

## 2018-11-07 DIAGNOSIS — R14.0 ABDOMINAL BLOATING: ICD-10-CM

## 2018-11-07 DIAGNOSIS — Z30.41 ENCOUNTER FOR SURVEILLANCE OF CONTRACEPTIVE PILLS: ICD-10-CM

## 2018-11-07 DIAGNOSIS — Z00.00 ROUTINE HISTORY AND PHYSICAL EXAMINATION OF ADULT: Primary | ICD-10-CM

## 2018-11-07 PROCEDURE — 99213 OFFICE O/P EST LOW 20 MIN: CPT | Mod: 25 | Performed by: NURSE PRACTITIONER

## 2018-11-07 PROCEDURE — 90471 IMMUNIZATION ADMIN: CPT | Performed by: NURSE PRACTITIONER

## 2018-11-07 PROCEDURE — 90686 IIV4 VACC NO PRSV 0.5 ML IM: CPT | Performed by: NURSE PRACTITIONER

## 2018-11-07 PROCEDURE — 99395 PREV VISIT EST AGE 18-39: CPT | Mod: 25 | Performed by: NURSE PRACTITIONER

## 2018-11-07 RX ORDER — DESOGESTREL AND ETHINYL ESTRADIOL 0.15-0.03
1 KIT ORAL DAILY
Qty: 84 TABLET | Refills: 3 | Status: SHIPPED | OUTPATIENT
Start: 2018-11-07 | End: 2019-12-30

## 2018-11-07 RX ORDER — CITALOPRAM HYDROBROMIDE 20 MG/1
20 TABLET ORAL DAILY
Qty: 90 TABLET | Refills: 3 | Status: SHIPPED | OUTPATIENT
Start: 2018-11-07 | End: 2019-09-11

## 2018-11-07 ASSESSMENT — PATIENT HEALTH QUESTIONNAIRE - PHQ9: SUM OF ALL RESPONSES TO PHQ QUESTIONS 1-9: 4

## 2018-11-07 ASSESSMENT — PAIN SCALES - GENERAL: PAINLEVEL: NO PAIN (0)

## 2018-11-07 NOTE — PROGRESS NOTES
SUBJECTIVE:   CC: Carrie Dalton is an 33 year old woman who presents for preventive health visit.     Healthy Habits:    Do you get at least three servings of calcium containing foods daily (dairy, green leafy vegetables, etc.)? yes    Amount of exercise or daily activities, outside of work: 6 day(s) per week    Problems taking medications regularly No    Medication side effects: No    Have you had an eye exam in the past two years? yes    Do you see a dentist twice per year? yes    Do you have sleep apnea, excessive snoring or daytime drowsiness?yes        Today's PHQ-2 Score:   PHQ-2 ( 1999 Pfizer) 11/7/2018 7/24/2017   Q1: Little interest or pleasure in doing things 0 0   Q2: Feeling down, depressed or hopeless 0 0   PHQ-2 Score 0 0       Abuse: Current or Past(Physical, Sexual or Emotional)- No  Do you feel safe in your environment - Yes    Social History   Substance Use Topics     Smoking status: Never Smoker     Smokeless tobacco: Never Used      Comment: lives in non smoking household     Alcohol use Yes     If you drink alcohol do you typically have >3 drinks per day or >7 drinks per week? No                     Reviewed orders with patient.  Reviewed health maintenance and updated orders accordingly - Yes  Labs reviewed in EPIC    Mammogram not appropriate for this patient based on age.    Pertinent mammograms are reviewed under the imaging tab.  History of abnormal Pap smear: NO - age 30-65 PAP every 5 years with negative HPV co-testing recommended  PAP / HPV Latest Ref Rng & Units 7/24/2017 12/5/2012 1/2/2012   PAP - NIL NIL NIL   HPV 16 DNA NEG Negative - -   HPV 18 DNA NEG Negative - -   OTHER HR HPV NEG Negative - -     Reviewed and updated as needed this visit by clinical staff  Tobacco  Allergies  Meds         Reviewed and updated as needed this visit by Provider            ROS:  CONSTITUTIONAL: NEGATIVE for fever, chills, change in weight  INTEGUMENTARU/SKIN: NEGATIVE for worrisome  "rashes, moles or lesions  EYES: NEGATIVE for vision changes or irritation  ENT: NEGATIVE for ear, mouth and throat problems  RESP: NEGATIVE for significant cough or SOB  BREAST: NEGATIVE for masses, tenderness or discharge  CV: NEGATIVE for chest pain, palpitations or peripheral edema  GI: POSITIVE for abdominal bloating which began in the last year- abdomen becomes firm and tender. Cannot identify triggers. Did travel to the [place] in January of this year and had traveler's diarrhea  : NEGATIVE for unusual urinary or vaginal symptoms. Periods are regular.  MUSCULOSKELETAL: NEGATIVE for significant arthralgias or myalgia  NEURO: NEGATIVE for weakness, dizziness or paresthesias  PSYCHIATRIC: POSITIVE for anxiety well controlled on Celexa    OBJECTIVE:   /70 (BP Location: Left arm, Patient Position: Chair, Cuff Size: Adult Regular)  Pulse 90  Temp 99  F (37.2  C) (Oral)  Ht 5' 4.5\" (1.638 m)  Wt 134 lb (60.8 kg)  LMP 09/28/2018  SpO2 100%  BMI 22.65 kg/m2  EXAM:  GENERAL: healthy, alert and no distress  EYES: Eyes grossly normal to inspection, PERRL and conjunctivae and sclerae normal  HENT: ear canals and TM's normal, nose and mouth without ulcers or lesions  NECK: no adenopathy, no asymmetry, masses, or scars and thyroid normal to palpation  RESP: lungs clear to auscultation - no rales, rhonchi or wheezes  CV: regular rate and rhythm, normal S1 S2, no S3 or S4, no murmur, click or rub, no peripheral edema and peripheral pulses strong  ABDOMEN: soft, nontender, no hepatosplenomegaly, no masses and bowel sounds normal  MS: no gross musculoskeletal defects noted, no edema  SKIN: no suspicious lesions or rashes  NEURO: Normal strength and tone, mentation intact and speech normal  PSYCH: mentation appears normal, affect normal/bright    Diagnostic Test Results:  No results found for this or any previous visit (from the past 24 hour(s)).    ASSESSMENT/PLAN:   1. Routine history and physical " "examination of adult      2. Encounter for surveillance of contraceptive pills  continue pills without change  - desogestrel-ethinyl estradiol (APRI) 0.15-30 MG-MCG per tablet; Take 1 tablet by mouth daily  Dispense: 84 tablet; Refill: 3    3. Generalized anxiety disorder  Doing well, continue medication without change  - citalopram (CELEXA) 20 MG tablet; Take 1 tablet (20 mg) by mouth daily  Dispense: 90 tablet; Refill: 3    4. Abdominal bloating  Will do stool tests for chronic infections.   If negative, consider dairy-free diet for 2 weeks.   Follow-up if neither of these measures helpful  - H Pylori antigen, stool; Future  - Giardia antigen; Future    5. Need for prophylactic vaccination and inoculation against influenza    - FLU VACCINE, SPLIT VIRUS, IM (QUADRIVALENT) [16722]- >3 YRS  - Vaccine Administration, Initial [64286]    COUNSELING:   Reviewed preventive health counseling, as reflected in patient instructions       Regular exercise       Healthy diet/nutrition       Immunizations    Vaccinated for: Influenza             Contraception    BP Readings from Last 1 Encounters:   11/07/18 114/70     Estimated body mass index is 22.65 kg/(m^2) as calculated from the following:    Height as of this encounter: 5' 4.5\" (1.638 m).    Weight as of this encounter: 134 lb (60.8 kg).           reports that she has never smoked. She has never used smokeless tobacco.      Counseling Resources:  ATP IV Guidelines  Pooled Cohorts Equation Calculator  Breast Cancer Risk Calculator  FRAX Risk Assessment  ICSI Preventive Guidelines  Dietary Guidelines for Americans, 2010  USDA's MyPlate  ASA Prophylaxis  Lung CA Screening    KANDACE Choi [facility]    Injectable Influenza Immunization Documentation    1.  Is the person to be vaccinated sick today?   No    2. Does the person to be vaccinated have an allergy to a component   of the vaccine?   No  Egg Allergy Algorithm Link    3. Has the person " to be vaccinated ever had a serious reaction   to influenza vaccine in the past?   No    4. Has the person to be vaccinated ever had Guillain-Barré syndrome?   No    Form completed by Jessica Mahmood MA

## 2018-11-07 NOTE — PATIENT INSTRUCTIONS
Try Zaditor or Alaway drops OTC    Preventive Health Recommendations  Female Ages 26 - 39  Yearly exam:   See your health care provider every year in order to    Review health changes.     Discuss preventive care.      Review your medicines if you your doctor has prescribed any.    Until age 30: Get a Pap test every three years (more often if you have had an abnormal result).    After age 30: Talk to your doctor about whether you should have a Pap test every 3 years or have a Pap test with HPV screening every 5 years.   You do not need a Pap test if your uterus was removed (hysterectomy) and you have not had cancer.  You should be tested each year for STDs (sexually transmitted diseases), if you're at risk.   Talk to your provider about how often to have your cholesterol checked.  If you are at risk for diabetes, you should have a diabetes test (fasting glucose).  Shots: Get a flu shot each year. Get a tetanus shot every 10 years.   Nutrition:     Eat at least 5 servings of fruits and vegetables each day.    Eat whole-grain bread, whole-wheat pasta and brown rice instead of white grains and rice.    Get adequate Calcium and Vitamin D.     Lifestyle    Exercise at least 150 minutes a week (30 minutes a day, 5 days of the week). This will help you control your weight and prevent disease.    Limit alcohol to one drink per day.    No smoking.     Wear sunscreen to prevent skin cancer.    See your dentist every six months for an exam and cleaning.    [facility]    If you have any questions regarding to your visit please contact your care team:       Team Red:   Clinic Hours Telephone Number   Dr. Lora Oquendo NP 7am-7pm  Monday - Thursday   7am-5pm  Fridays  [phone]  (Appointment scheduling available 24/7)   Ur[facility] - [place] [facility] - 11am-9pm Monday-Friday Saturday-Sunday- 9am-5pm   [place] - 5pm-9pm Monday-Friday  Saturday-Sunday- 9am-5pm  [phone] - [place]ora Kern  [phone] - [place]       What options do I have for a visit other than an office visit? We offer electronic visits (e-visits) and telephone visits, when medically appropriate.  Please check with your medical insurance to see if these types of visits are covered, as you will be responsible for any charges that are not paid by your insurance.      You can use [employer] (secure electronic communication) to access to your chart, send your primary care provider a message, or make an appointment. Ask a team member how to get started.     For a price quote for your services, please call our Consumer Price Line at [phone] or our Imaging Cost estimation line at [phone] (for imaging tests).    Discharged by Jessica Mahmood MA.      Preventive Health Recommendations  Female Ages 26 - 39  Yearly exam:   See your health care provider every year in order to    Review health changes.     Discuss preventive care.      Review your medicines if you your doctor has prescribed any.    Until age 30: Get a Pap test every three years (more often if you have had an abnormal result).    After age 30: Talk to your doctor about whether you should have a Pap test every 3 years or have a Pap test with HPV screening every 5 years.   You do not need a Pap test if your uterus was removed (hysterectomy) and you have not had cancer.  You should be tested each year for STDs (sexually transmitted diseases), if you're at risk.   Talk to your provider about how often to have your cholesterol checked.  If you are at risk for diabetes, you should have a diabetes test (fasting glucose).  Shots: Get a flu shot each year. Get a tetanus shot every 10 years.   Nutrition:     Eat at least 5 servings of fruits and vegetables each day.    Eat whole-grain bread, whole-wheat pasta and brown rice instead of white grains and rice.    Get adequate Calcium and Vitamin D.     Lifestyle    Exercise at  least 150 minutes a week (30 minutes a day, 5 days of the week). This will help you control your weight and prevent disease.    Limit alcohol to one drink per day.    No smoking.     Wear sunscreen to prevent skin cancer.    See your dentist every six months for an exam and cleaning.

## 2018-11-07 NOTE — MR AVS SNAPSHOT
After Visit Summary   11/7/2018    Carrie Dalton    MRN: [id number]           Patient Information     Date Of Birth          1985        Visit Information        Provider Department      11/7/2018 3:40 PM Rylie Oquendo APRN [facility]        Today's Diagnoses     Routine history and physical examination of adult    -  1    Encounter for surveillance of contraceptive pills        Generalized anxiety disorder        Abdominal bloating          Care Instructions    Try Zaditor or Alaway drops OTC    Preventive Health Recommendations  Female Ages 26 - 39  Yearly exam:   See your health care provider every year in order to    Review health changes.     Discuss preventive care.      Review your medicines if you your doctor has prescribed any.    Until age 30: Get a Pap test every three years (more often if you have had an abnormal result).    After age 30: Talk to your doctor about whether you should have a Pap test every 3 years or have a Pap test with HPV screening every 5 years.   You do not need a Pap test if your uterus was removed (hysterectomy) and you have not had cancer.  You should be tested each year for STDs (sexually transmitted diseases), if you're at risk.   Talk to your provider about how often to have your cholesterol checked.  If you are at risk for diabetes, you should have a diabetes test (fasting glucose).  Shots: Get a flu shot each year. Get a tetanus shot every 10 years.   Nutrition:     Eat at least 5 servings of fruits and vegetables each day.    Eat whole-grain bread, whole-wheat pasta and brown rice instead of white grains and rice.    Get adequate Calcium and Vitamin D.     Lifestyle    Exercise at least 150 minutes a week (30 minutes a day, 5 days of the week). This will help you control your weight and prevent disease.    Limit alcohol to one drink per day.    No smoking.     Wear sunscreen to prevent skin cancer.    See your dentist every  six months for an exam and cleaning.    [facility]    If you have any questions regarding to your visit please contact your care team:       Team Red:   Clinic Hours Telephone Number   Dr. Lora Oquendo, NP 7am-7pm  Monday - Thursday   7am-5pm  Fridays  [phone]  (Appointment scheduling available 24/7)   Urgent Care - [place] and [facility] - 11am-9pm Monday-Friday Saturday-Sunday- 9am-5pm   [place] - 5pm-9pm Monday-Friday Saturday-Sunday- 9am-5pm  [phone] - [place]  [phone] - [place]       What options do I have for a visit other than an office visit? We offer electronic visits (e-visits) and telephone visits, when medically appropriate.  Please check with your medical insurance to see if these types of visits are covered, as you will be responsible for any charges that are not paid by your insurance.      You can use [employer] (secure electronic communication) to access to your chart, send your primary care provider a message, or make an appointment. Ask a team member how to get started.     For a price quote for your services, please call our Consumer Price Line at [phone] or our Imaging Cost estimation line at [phone] (for imaging tests).    Discharged by Jessica Mahmood MA.            Follow-ups after your visit        Follow-up notes from your care team     Return in about 1 year (around 11/7/2019) for Physical.      Your next 10 appointments already scheduled     Nov 30, 2018  9:00 AM CST   [employer] [facility] Return with MARIAMA Taylor   [place] [facility] ([facility])    [address] [place] [id number] [phone]              Future tests that were ordered for you today     Open Future Orders        Priority Expected Expires Ordered    H Pylori antigen, stool Routine  12/7/2018 11/7/2018    Giardia antigen Routine  11/7/2019  "11/7/2018            Who to contact     If you have questions or need follow up information about today's clinic visit or your schedule please contact [facility] [place] directly at [phone].  Normal or non-critical lab and imaging results will be communicated to you by MyChart, letter or phone within 4 business days after the clinic has received the results. If you do not hear from us within 7 days, please contact the clinic through MyChart or phone. If you have a critical or abnormal lab result, we will notify you by phone as soon as possible.  Submit refill requests through [employer] or call your pharmacy and they will forward the refill request to us. Please allow 3 business days for your refill to be completed.          Additional Information About Your Visit        [facility] Information     [employer] gives you secure access to your electronic health record. If you see a primary care provider, you can also send messages to your care team and make appointments. If you have questions, please call your primary care clinic.  If you do not have a primary care provider, please call [phone] and they will assist you.        Care EveryWhere ID     This is your Care EveryWhere ID. This could be used by other organizations to access your [place] medical records  [id number]        Your Vitals Were     Pulse Temperature Height Last Period Pulse Oximetry BMI (Body Mass Index)    90 99  F (37.2  C) (Oral) 5' 4.5\" (1.638 m) 09/28/2018 100% 22.65 kg/m2       Blood Pressure from Last 3 Encounters:   11/07/18 114/70   07/24/17 114/72   04/06/17 122/79    Weight from Last 3 Encounters:   11/07/18 134 lb (60.8 kg)   07/24/17 123 lb (55.8 kg)   04/06/17 125 lb (56.7 kg)                 Where to get your medicines      These medications were sent to [facility] [id number] [facility] [address]  [address] [address] [address]     Phone:  [phone]     citalopram 20 " MG tablet    desogestrel-ethinyl estradiol 0.15-30 MG-MCG per tablet          Primary Care Provider Office Phone # Fax #    Rylie Oquendo, KANDACE [phone] [address]  [place] [address]        Equal Access to Services     EZE RESTREPO : Hadii aad ku hadasho Soomaali, waaxda luqadaha, qaybta kaalmada adeegyada, waxay michelinein hayaan adetiki masoudshiloh malave . So [facility] [phone].    ATENCIÓN: Si habla español, tiene a sheffield disposición servicios gratuitos de asistencia lingüística. [facility] [phone].    We comply with applicable federal civil rights laws and [place] laws. We do not discriminate on the basis of race, color, national origin, age, disability, sex, sexual orientation, or gender identity.            Thank you!     Thank you for choosing [facility]  for your care. Our goal is always to provide you with excellent care. Hearing back from our patients is one way we can continue to improve our services. Please take a few minutes to complete the written survey that you may receive in the mail after your visit with us. Thank you!             Your Updated Medication List - Protect others around you: Learn how to safely use, store and throw away your medicines at www.disposemymeds.org.          This list is accurate as of 11/7/18  4:20 PM.  Always use your most recent med list.                   Brand Name Dispense Instructions for use Diagnosis    citalopram 20 MG tablet    celeXA    90 tablet    Take 1 tablet (20 mg) by mouth daily    Generalized anxiety disorder       desogestrel-ethinyl estradiol 0.15-30 MG-MCG per tablet    APRI    84 tablet    Take 1 tablet by mouth daily    Encounter for surveillance of contraceptive pills       * methylphenidate ER 18 MG CR tablet    CONCERTA    30 tablet    Take 1 tablet (18 mg) by mouth daily    Organic hypersomnia       * methylphenidate ER 18 MG CR tablet    CONCERTA    30 tablet    Take 1 tablet (18 mg) by mouth  daily    Organic hypersomnia       * methylphenidate ER 18 MG CR tablet    CONCERTA    30 tablet    Take 1 tablet (18 mg) by mouth daily    Organic hypersomnia       * Notice:  This list has 3 medication(s) that are the same as other medications prescribed for you. Read the directions carefully, and ask your doctor or other care provider to review them with you.

## 2018-11-16 DIAGNOSIS — R14.0 ABDOMINAL BLOATING: ICD-10-CM

## 2018-11-16 PROCEDURE — 87338 HPYLORI STOOL AG IA: CPT | Performed by: NURSE PRACTITIONER

## 2018-11-16 PROCEDURE — 87329 GIARDIA AG IA: CPT | Performed by: NURSE PRACTITIONER

## 2018-11-19 LAB
G LAMBLIA AG STL QL IA: NORMAL
H PYLORI AG STL QL IA: NORMAL
SPECIMEN SOURCE: NORMAL
SPECIMEN SOURCE: NORMAL

## 2018-11-30 ENCOUNTER — OFFICE VISIT (OUTPATIENT)
Dept: SLEEP MEDICINE | Facility: CLINIC | Age: 33
End: 2018-11-30
Payer: COMMERCIAL

## 2018-11-30 VITALS
OXYGEN SATURATION: 100 % | WEIGHT: 132 LBS | HEIGHT: 64 IN | BODY MASS INDEX: 22.53 KG/M2 | DIASTOLIC BLOOD PRESSURE: 76 MMHG | HEART RATE: 72 BPM | SYSTOLIC BLOOD PRESSURE: 117 MMHG

## 2018-11-30 DIAGNOSIS — G47.10 ORGANIC HYPERSOMNIA: Chronic | ICD-10-CM

## 2018-11-30 PROCEDURE — 99214 OFFICE O/P EST MOD 30 MIN: CPT | Performed by: PHYSICIAN ASSISTANT

## 2018-11-30 RX ORDER — METHYLPHENIDATE HYDROCHLORIDE 18 MG/1
18 TABLET ORAL DAILY
Qty: 30 TABLET | Refills: 0 | Status: SHIPPED | OUTPATIENT
Start: 2019-01-30 | End: 2021-02-17

## 2018-11-30 RX ORDER — METHYLPHENIDATE HYDROCHLORIDE 18 MG/1
18 TABLET ORAL DAILY
Qty: 30 TABLET | Refills: 0 | Status: SHIPPED | OUTPATIENT
Start: 2018-11-30 | End: 2018-11-30

## 2018-11-30 RX ORDER — METHYLPHENIDATE HYDROCHLORIDE 18 MG/1
18 TABLET ORAL DAILY
Qty: 30 TABLET | Refills: 0 | Status: SHIPPED | OUTPATIENT
Start: 2018-12-30 | End: 2018-11-30

## 2018-11-30 NOTE — MR AVS SNAPSHOT
After Visit Summary   11/30/2018    Carrie Dalton    MRN: [id number]           Patient Information     Date Of Birth          1985        Visit Information        Provider Department      11/30/2018 9:00 AM Krista Watson PA [facility]        Today's Diagnoses     Organic hypersomnia          Care Instructions      Your BMI is Body mass index is 22.66 kg/(m^2).  Weight management is a personal decision.  If you are interested in exploring weight loss strategies, the following discussion covers the approaches that may be successful. Body mass index (BMI) is one way to tell whether you are at a healthy weight, overweight, or obese. It measures your weight in relation to your height.  A BMI of 18.5 to 24.9 is in the healthy range. A person with a BMI of 25 to 29.9 is considered overweight, and someone with a BMI of 30 or greater is considered obese. More than two-thirds of American adults are considered overweight or obese.  Being overweight or obese increases the risk for further weight gain. Excess weight may lead to heart disease and diabetes.  Creating and following plans for healthy eating and physical activity may help you improve your health.  Weight control is part of healthy lifestyle and includes exercise, emotional health, and healthy eating habits. Careful eating habits lifelong are the mainstay of weight control. Though there are significant health benefits from weight loss, long-term weight loss with diet alone may be very difficult to achieve- studies show long-term success with dietary management in less than 10% of people. Attaining a healthy weight may be especially difficult to achieve in those with severe obesity. In some cases, medications, devices and surgical management might be considered.  What can you do?  If you are overweight or obese and are interested in methods for weight loss, you should discuss this with your provider.     Consider reducing  daily calorie intake by 500 calories.     Keep a food journal.     Avoiding skipping meals, consider cutting portions instead.    Diet combined with exercise helps maintain muscle while optimizing fat loss. Strength training is particularly important for building and maintaining muscle mass. Exercise helps reduce stress, increase energy, and improves fitness. Increasing exercise without diet control, however, may not burn enough calories to loose weight.       Start walking three days a week 10-20 minutes at a time    Work towards walking thirty minutes five days a week     Eventually, increase the speed of your walking for 1-2 minutes at time    In addition, we recommend that you review healthy lifestyles and methods for weight loss available through the National Institutes of Health patient information sites:  http://win.niddk.nih.gov/publications/index.htm    And look into health and wellness programs that may be available through your health insurance provider, employer, local community center, or [employer]ura club.                  Follow-ups after your visit        Who to contact     If you have questions or need follow up information about today's clinic visit or your schedule please contact [facility] directly at [phone].  Normal or non-critical lab and imaging results will be communicated to you by MyChart, letter or phone within 4 business days after the clinic has received the results. If you do not hear from us within 7 days, please contact the clinic through [employer] or phone. If you have a critical or abnormal lab result, we will notify you by phone as soon as possible.  Submit refill requests through [employer] or call your pharmacy and they will forward the refill request to us. Please allow 3 business days for your refill to be completed.          Additional Information About Your Visit        [employer] Information     [employer] gives you secure access to your electronic health record. If you  "see a primary care provider, you can also send messages to your care team and make appointments. If you have questions, please call your primary care clinic.  If you do not have a primary care provider, please call [phone] and they will assist you.        Care EveryWhere ID     This is your Care EveryWhere ID. This could be used by other organizations to access your [place] medical records  [id number]        Your Vitals Were     Pulse Height Pulse Oximetry BMI (Body Mass Index)          72 1.626 m (5' 4\") 100% 22.66 kg/m2         Blood Pressure from Last 3 Encounters:   11/30/18 117/76   11/07/18 114/70   07/24/17 114/72    Weight from Last 3 Encounters:   11/30/18 59.9 kg (132 lb)   11/07/18 60.8 kg (134 lb)   07/24/17 55.8 kg (123 lb)              Today, you had the following     No orders found for display         Today's Medication Changes          These changes are accurate as of 11/30/18  9:30 AM.  If you have any questions, ask your nurse or doctor.               These medicines have changed or have updated prescriptions.        Dose/Directions    * methylphenidate 18 MG CR tablet   Commonly known as:  CONCERTA   This may have changed:  Another medication with the same name was added. Make sure you understand how and when to take each.   Used for:  Organic hypersomnia   Changed by:  Krista Watson PA        Dose:  18 mg   Take 1 tablet (18 mg) by mouth daily   Quantity:  30 tablet   Refills:  0       * methylphenidate 18 MG CR tablet   Commonly known as:  CONCERTA   This may have changed:  Another medication with the same name was added. Make sure you understand how and when to take each.   Used for:  Organic hypersomnia   Changed by:  Krista Watson PA        Dose:  18 mg   Take 1 tablet (18 mg) by mouth daily   Quantity:  30 tablet   Refills:  0       * methylphenidate 18 MG CR tablet   Commonly known as:  CONCERTA   This may have changed:  You were already taking a medication with the same name, " and this prescription was added. Make sure you understand how and when to take each.   Used for:  Organic hypersomnia   Changed by:  Krista Watson PA        Dose:  18 mg   Start taking on:  1/30/2019   Take 1 tablet (18 mg) by mouth daily   Quantity:  30 tablet   Refills:  0       * Notice:  This list has 3 medication(s) that are the same as other medications prescribed for you. Read the directions carefully, and ask your doctor or other care provider to review them with you.         Where to get your medicines      Some of these will need a paper prescription and others can be bought over the counter.  Ask your nurse if you have questions.     Bring a paper prescription for each of these medications     methylphenidate 18 MG CR tablet                Primary Care Provider Office Phone # Fax #    Rylie KANDACE Mooney [phone] [address] [facility] [address]        Equal Access to Services     [facility]: Hadii aad ku hadasho Soisabel, waaxda luqadaha, qaybta kaalmada adetikiyada, max malave . So [facility] [phone].    ATENCIÓN: Si habla español, tiene a sheffield disposición servicios gratuitos de asistencia lingüística. Llame al [phone].    We comply with applicable federal civil rights laws and [place] laws. We do not discriminate on the basis of race, color, national origin, age, disability, sex, sexual orientation, or gender identity.            Thank you!     Thank you for choosing [facility]  for your care. Our goal is always to provide you with excellent care. Hearing back from our patients is one way we can continue to improve our services. Please take a few minutes to complete the written survey that you may receive in the mail after your visit with us. Thank you!             Your Updated Medication List - Protect others around you: Learn how to safely use, store and throw away your medicines at www.disposemymeds.org.           This list is accurate as of 11/30/18  9:30 AM.  Always use your most recent med list.                   Brand Name Dispense Instructions for use Diagnosis    citalopram 20 MG tablet    celeXA    90 tablet    Take 1 tablet (20 mg) by mouth daily    Generalized anxiety disorder       desogestrel-ethinyl estradiol 0.15-30 MG-MCG tablet    APRI    84 tablet    Take 1 tablet by mouth daily    Encounter for surveillance of contraceptive pills       * methylphenidate 18 MG CR tablet    CONCERTA    30 tablet    Take 1 tablet (18 mg) by mouth daily    Organic hypersomnia       * methylphenidate 18 MG CR tablet    CONCERTA    30 tablet    Take 1 tablet (18 mg) by mouth daily    Organic hypersomnia       * methylphenidate 18 MG CR tablet   Start taking on:  1/30/2019    CONCERTA    30 tablet    Take 1 tablet (18 mg) by mouth daily    Organic hypersomnia       * Notice:  This list has 3 medication(s) that are the same as other medications prescribed for you. Read the directions carefully, and ask your doctor or other care provider to review them with you.

## 2018-11-30 NOTE — NURSING NOTE
"Chief Complaint   Patient presents with     RECHECK     med f/u       Initial /76  Pulse 72  Ht 1.626 m (5' 4\")  Wt 59.9 kg (132 lb)  SpO2 100%  BMI 22.66 kg/m2 Estimated body mass index is 22.66 kg/(m^2) as calculated from the following:    Height as of this encounter: 1.626 m (5' 4\").    Weight as of this encounter: 59.9 kg (132 lb).    Medication Reconciliation: complete      "

## 2018-11-30 NOTE — PATIENT INSTRUCTIONS

## 2018-11-30 NOTE — PROGRESS NOTES
Medication Follow-Up Visit:    Chief Complaint   Patient presents with     RECHECK     med f/u       Carrie Dalton comes in today for follow-up of idiopathic hypersomnolence treated with Concerta CR 18 mg daily.     Overall, the patient reports they are doing good.  Patient is not having medication side effects.     During work days bedtime is typically 10 PM. Usually it takes about 20 minutes to fall asleep. They are typically getting out of bed at 5:30 AM.    On non-work days bedtime is typically 10 Pm. Usually it takes about 20 minutes to fall asleep. They are typically getting out of bed at 7 aM.      The patient is usually getting 8-9 hours of sleep per night.  Patient is not napping.   Patient is using caffeine.    Total score - Fraser: 7 (11/30/2018  8:00 AM)    GUSTAVO Total Score: 4    In review:  Carrie Dalton initially presented with excessive daytime somnolence (ESS 13) to the extent of jeopardizing the patient's ability to drive and causing cognitive difficulties. Hypersomnia work up recommended.    Actigraphy-   Dates of recording: from 6/10/16 to 6/27/16              Quality: good   Sleep diary:   correlates well most of the time   Sleep onset typically/or range at   9-10PM, but 11-1 AM one to three time/week (5 occasions)       Sleep onset delay typically/or range <30 minutes  Sleep offset typically/or range at   5-6 AM, 9-10 AM once per week (3 occasions)  Total sleep time estimated is 8.4 hrs, with shortest sleep period time of 7 and longest sleep period time of 10.  Sleep periods are consolidated  Light exposure periods are usually appropriately timed to sleep schedule. Light was on the whole night on the last night of the study  Napping is not noted on diary but suspected by actigraphy on one occasion  Interpretation: Sleep wake pattern is consistent with regular sleep pattern with sufficient sleep. Light was on the whole night on the last night of the  study        Polysomnography-  Sleep Architecture:    The total recording time of the polysomnogram was 445.8 minutes.  The total sleep time was 381.0 minutes.  Sleep latency was normal at 17.3 minutes without the use of a sleep aid.  REM latency was 261.5 minutes.  Arousal index was increased at 37.6 arousals per hour.  Sleep efficiency was normal at 85.5%.  Wake after sleep onset was 47.0 minutes.  The patient spent 12.7% of total sleep time in Stage N1, 54.7% in Stage N2, 18.9% in Stages N3, and 13.6% in REM.  Time in REM supine was 0 minutes.  Respiration:      Events - The polysomnogram revealed a presence of 0 obstructive, 2 central, and 0 mixed apneas resulting in an apnea index of 0.3 events per hour.  There were 0 hypopneas resulting in a hypopnea index of 0 events per hour.  The combined apnea/hypopnea index was 0.3 events per hour.  The REM AHI was 0 events per hour.  The supine AHI was 0 events per hour.  The RDI was 0.3 events per hour.    Snoring - was reported as absent    Respiratory rate and pattern - was notable for normal respiratory rate and pattern.    Sustained Sleep Associated Hypoventilation - Transcutaneous carbon dioxide monitoring was not used      Sleep Associated Hypoxemia - (Greater than 5 minutes O2 sat below 89%) was not present.  Baseline oxygen saturation was 96.9%. Lowest oxygen saturation was 94%.  Movement Activity:     Periodic Limb Activity - There were 0 PLMs during the entire study. The PLM index was - movements per hour.  The PLM Arousal Index was - per hour.    REM EMG Activity - Excessive transient / sustained muscle activity was not present.    Nocturnal Behavior - Abnormal sleep related behaviors were not noted      Bruxism - None apparent           Multiple sleep latency test-  Following the overnight PSG, the patient underwent a series of 5 naps. The latencies to the naps were 8.0 min, 20.0 min, 12.5 min, 4.0min, and 8.5 min, respectively. This gives an average sleep  "latency of 10.6 minutes. There were no sleep onset REM periods. During nap 2, patient stated that she could hear talking and doors opening and closin from wihin the sleep clinic. This may have contributed to no sleep being witnessed during that nap     Urine Drug Screen   Urine drug screen positive for caffeine, citalopram, and theobromine        Past medical/surgical history, family history, social history, medications and allergies were reviewed.      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 Provider to review and confirm.       Recurrent HSV (herpes simplex virus) 01/02/2012     Priority: Medium     CARDIOVASCULAR SCREENING; LDL GOAL LESS THAN 160 10/31/2010     Priority: Medium     History of seizure disorder 06/09/2010     Priority: Medium     1997 unknown etiology.  Stopped dilantin 1999          /76  Pulse 72  Ht 1.626 m (5' 4\")  Wt 59.9 kg (132 lb)  SpO2 100%  BMI 22.66 kg/m2    Impression/Plan:  1. Idiopathic hypersomnia-  - Doing well on Concerta 18 mg CR Q AM. No changes tofay.  - Refill x 3 months.   - Follow up in about a year/sooner if questions/concerns      Twenty-five minutes spent with patient, all of which were spent face-to-face counseling, consulting, coordinating plan of care regarding hypersomnia.      Krista Watson PA-C    "

## 2019-08-06 NOTE — TELEPHONE ENCOUNTER
Called pt and informed her rxs will be at the  in the sleep center [facility].  April [facility] 7/6/2018 12:48 PM     36.8

## 2019-09-11 DIAGNOSIS — F41.1 GENERALIZED ANXIETY DISORDER: Chronic | ICD-10-CM

## 2019-09-13 RX ORDER — CITALOPRAM HYDROBROMIDE 20 MG/1
TABLET ORAL
Qty: 90 TABLET | Refills: 0 | Status: SHIPPED | OUTPATIENT
Start: 2019-09-13 | End: 2019-12-06

## 2019-11-11 ENCOUNTER — IMMUNIZATION (OUTPATIENT)
Dept: NURSING | Facility: CLINIC | Age: 34
End: 2019-11-11
Payer: COMMERCIAL

## 2019-11-11 PROCEDURE — 90471 IMMUNIZATION ADMIN: CPT

## 2019-11-11 PROCEDURE — 90686 IIV4 VACC NO PRSV 0.5 ML IM: CPT

## 2019-12-06 ENCOUNTER — MYC REFILL (OUTPATIENT)
Dept: FAMILY MEDICINE | Facility: CLINIC | Age: 34
End: 2019-12-06

## 2019-12-06 DIAGNOSIS — F41.1 GENERALIZED ANXIETY DISORDER: Chronic | ICD-10-CM

## 2019-12-06 RX ORDER — CITALOPRAM HYDROBROMIDE 20 MG/1
20 TABLET ORAL DAILY
Qty: 30 TABLET | Refills: 0 | Status: SHIPPED | OUTPATIENT
Start: 2019-12-06 | End: 2019-12-30

## 2019-12-12 DIAGNOSIS — F41.1 GENERALIZED ANXIETY DISORDER: Chronic | ICD-10-CM

## 2019-12-12 NOTE — TELEPHONE ENCOUNTER
Duplicate, 1st request.    citalopram (CELEXA) 20 MG tablet 30 tablet 0 12/6/2019  No   Sig - Route: Take 1 tablet (20 mg) by mouth daily - Oral   Sent to pharmacy as: citalopram (CELEXA) 20 MG tablet   Class: E-Prescribe   Order: [id number]   E-Prescribing Status: Receipt confirmed by pharmacy (12/6/2019  3:10 PM CST)     Nguyen STILES CMA ([facility])

## 2019-12-13 RX ORDER — CITALOPRAM HYDROBROMIDE 20 MG/1
TABLET ORAL
Qty: 90 TABLET | Refills: 0 | OUTPATIENT
Start: 2019-12-13

## 2019-12-13 NOTE — TELEPHONE ENCOUNTER
Refused Prescriptions:                       Disp   Refills    citalopram (CELEXA) 20 MG tablet [Pharmacy*90 tab*0        Sig: TAKE 1 TABLET BY MOUTH ONCE DAILY  Refused By: ADRIAN CROWELL  Reason for Refusal: Duplicate  Reason for Refusal Comment: see rx sent 12/6/19    COnfirmed with pharmacy they have rx sent 12/6/19, pt has appt on 12/30/19.    Adrian Crowell, RN

## 2019-12-30 ENCOUNTER — OFFICE VISIT (OUTPATIENT)
Dept: FAMILY MEDICINE | Facility: CLINIC | Age: 34
End: 2019-12-30
Payer: COMMERCIAL

## 2019-12-30 VITALS
SYSTOLIC BLOOD PRESSURE: 108 MMHG | BODY MASS INDEX: 23.82 KG/M2 | OXYGEN SATURATION: 97 % | TEMPERATURE: 98.5 F | DIASTOLIC BLOOD PRESSURE: 64 MMHG | HEART RATE: 88 BPM | WEIGHT: 143 LBS | HEIGHT: 65 IN

## 2019-12-30 DIAGNOSIS — F40.8 OTHER PHOBIC ANXIETY DISORDERS: ICD-10-CM

## 2019-12-30 DIAGNOSIS — F41.1 GENERALIZED ANXIETY DISORDER: Primary | Chronic | ICD-10-CM

## 2019-12-30 DIAGNOSIS — Z30.41 ENCOUNTER FOR SURVEILLANCE OF CONTRACEPTIVE PILLS: ICD-10-CM

## 2019-12-30 PROCEDURE — 99214 OFFICE O/P EST MOD 30 MIN: CPT | Performed by: NURSE PRACTITIONER

## 2019-12-30 RX ORDER — DESOGESTREL AND ETHINYL ESTRADIOL 0.15-0.03
1 KIT ORAL DAILY
Qty: 84 TABLET | Refills: 3 | Status: SHIPPED | OUTPATIENT
Start: 2019-12-30 | End: 2021-05-05

## 2019-12-30 RX ORDER — PROPRANOLOL HYDROCHLORIDE 40 MG/1
40 TABLET ORAL 3 TIMES DAILY PRN
Qty: 60 TABLET | Refills: 2 | Status: SHIPPED | OUTPATIENT
Start: 2019-12-30 | End: 2019-12-31

## 2019-12-30 RX ORDER — CITALOPRAM HYDROBROMIDE 20 MG/1
10 TABLET ORAL DAILY
Qty: 270 TABLET | Refills: 1 | Status: SHIPPED | OUTPATIENT
Start: 2019-12-30 | End: 2020-01-02

## 2019-12-30 ASSESSMENT — MIFFLIN-ST. JEOR: SCORE: 1341.58

## 2019-12-30 ASSESSMENT — ANXIETY QUESTIONNAIRES
3. WORRYING TOO MUCH ABOUT DIFFERENT THINGS: NEARLY EVERY DAY
GAD7 TOTAL SCORE: 16
6. BECOMING EASILY ANNOYED OR IRRITABLE: SEVERAL DAYS
1. FEELING NERVOUS, ANXIOUS, OR ON EDGE: NEARLY EVERY DAY
IF YOU CHECKED OFF ANY PROBLEMS ON THIS QUESTIONNAIRE, HOW DIFFICULT HAVE THESE PROBLEMS MADE IT FOR YOU TO DO YOUR WORK, TAKE CARE OF THINGS AT HOME, OR GET ALONG WITH OTHER PEOPLE: SOMEWHAT DIFFICULT
5. BEING SO RESTLESS THAT IT IS HARD TO SIT STILL: SEVERAL DAYS
7. FEELING AFRAID AS IF SOMETHING AWFUL MIGHT HAPPEN: MORE THAN HALF THE DAYS
2. NOT BEING ABLE TO STOP OR CONTROL WORRYING: NEARLY EVERY DAY

## 2019-12-30 ASSESSMENT — PATIENT HEALTH QUESTIONNAIRE - PHQ9: 5. POOR APPETITE OR OVEREATING: NEARLY EVERY DAY

## 2019-12-30 NOTE — TELEPHONE ENCOUNTER
Disp Refills Start End CLAIRE   propranolol (INDERAL) 40 MG tablet 60 tablet 2 12/30/2019  --   Sig - Route: Take 1 tablet (40 mg) by mouth 3 times daily as needed (anxiety) - Oral   Sent to pharmacy as: propranolol (INDERAL) 40 MG tablet   Class: E-Prescribe   Order: [id number]   E-Prescribing Status: Receipt confirmed by pharmacy (12/30/2019  3:51 PM CST)     Alaina Lin MA on 12/30/2019 at 4:13 PM

## 2019-12-30 NOTE — PROGRESS NOTES
Subjective     Carrie Dalton is a 34 year old female who presents to clinic today for the following health issues:    HPI     Anxiety Follow-Up    How are you doing with your anxiety since your last visit? Worsened     Are you having other symptoms that might be associated with anxiety? No    Have you had a significant life event? No     Are you feeling depressed? No    Do you have any concerns with your use of alcohol or other drugs? No    Social History     Tobacco Use     Smoking status: Never Smoker     Smokeless tobacco: Never Used     Tobacco comment: lives in non smoking household   Substance Use Topics     Alcohol use: Yes     Drug use: No     ELIZABETH-7 SCORE 9/21/2016 7/24/2017   Total Score 10 4     PHQ 7/24/2017 11/7/2018   PHQ-9 Total Score 0 4   Q9: Thoughts of better off dead/self-harm past 2 weeks Not at all Not at all     Last PHQ-9 11/7/2018   1.  Little interest or pleasure in doing things 0   2.  Feeling down, depressed, or hopeless 0   3.  Trouble falling or staying asleep, or sleeping too much 1   4.  Feeling tired or having little energy 2   5.  Poor appetite or overeating 0   6.  Feeling bad about yourself 0   7.  Trouble concentrating 1   8.  Moving slowly or restless 0   Q9: Thoughts of better off dead/self-harm past 2 weeks 0   PHQ-9 Total Score 4   Difficulty at work, home, or with people Somewhat difficult     ELIZABETH-7  12/30/2019   1. Feeling nervous, anxious, or on edge 3   2. Not being able to stop or control worrying 3   3. Worrying too much about different things 3   4. Trouble relaxing 3   5. Being so restless that it is hard to sit still 1   6. Becoming easily annoyed or irritable 1   7. Feeling afraid, as if something awful might happen 2   ELIZABETH-7 Total Score 16   If you checked any problems, how difficult have they made it for you to do your work, take care of things at home, or get along with other people? Somewhat difficult         How many servings of fruits and vegetables do  "you eat daily?  4 or more    On average, how many sweetened beverages do you drink each day (Examples: soda, juice, sweet tea, etc.  Do NOT count diet or artificially sweetened beverages)?   0    How many days per week do you miss taking your medication? 0    Anxiety has worsened since last seen- driving tends to be a trigger. No prior accidents/trauma. Also has element of social anxiety.    Reviewed and updated as needed this visit by Provider         Review of Systems   ROS COMP: Constitutional, HEENT, cardiovascular, pulmonary, gi and gu systems are negative, except as otherwise noted.      Objective    /64 (BP Location: Left arm, Patient Position: Chair, Cuff Size: Adult Regular)   Pulse 88   Temp 98.5  F (36.9  C) (Oral)   Ht 1.638 m (5' 4.5\")   Wt 64.9 kg (143 lb)   SpO2 97%   BMI 24.17 kg/m    Body mass index is 24.17 kg/m .  Physical Exam   GENERAL: healthy, alert and no distress  PSYCH: mentation appears normal, affect normal/bright    Diagnostic Test Results:  Labs reviewed in Epic        Assessment & Plan     1. Generalized anxiety disorder  Increase Celexa dose and patient referred for therapy.  - MENTAL HEALTH REFERRAL  - Adult; Outpatient Treatment; Individual/Couples/Family/Group Therapy/Health Psychology[place] [facility] [phone]; We will contact you to schedule the appointment or please call with any questions  - citalopram (CELEXA) 10 MG tablet; Take 3 tablets (30 mg) by mouth daily  Dispense: 270 tablet; Refill: 1    2. Other phobic anxiety disorders  Avoid benzos as driving is a trigger and would not be safe. Offered trial of Propranolol PRN for driving/social situations.  - MENTAL HEALTH REFERRAL  - Adult; Outpatient Treatment; Individual/Couples/Family/Group Therapy/Health Psychology[place] [facility] [phone]; We will contact you to schedule the appointment or please call with any questions    3. Encounter for surveillance of " contraceptive pills  Medications refilled  - desogestrel-ethinyl estradiol (APRI) 0.15-30 MG-MCG tablet; Take 1 tablet by mouth daily  Dispense: 84 tablet; Refill: 3       See Patient Instructions    Return in about 3 months (around 3/30/2020) for Anxiety.    KANDACE Choi [facility]

## 2019-12-31 RX ORDER — PROPRANOLOL HYDROCHLORIDE 40 MG/1
40 TABLET ORAL 3 TIMES DAILY PRN
Qty: 270 TABLET | Refills: 1 | Status: SHIPPED | OUTPATIENT
Start: 2019-12-31 | End: 2021-05-05

## 2019-12-31 ASSESSMENT — ANXIETY QUESTIONNAIRES: GAD7 TOTAL SCORE: 16

## 2020-01-02 RX ORDER — CITALOPRAM HYDROBROMIDE 10 MG/1
30 TABLET ORAL DAILY
Qty: 270 TABLET | Refills: 1 | Status: SHIPPED | OUTPATIENT
Start: 2020-01-02 | End: 2020-11-11

## 2020-02-23 ENCOUNTER — HEALTH MAINTENANCE LETTER (OUTPATIENT)
Age: 35
End: 2020-02-23

## 2020-12-13 ENCOUNTER — HEALTH MAINTENANCE LETTER (OUTPATIENT)
Age: 35
End: 2020-12-13

## 2021-02-13 DIAGNOSIS — F41.1 GENERALIZED ANXIETY DISORDER: Chronic | ICD-10-CM

## 2021-02-17 RX ORDER — CITALOPRAM HYDROBROMIDE 10 MG/1
TABLET ORAL
Qty: 90 TABLET | Refills: 0 | Status: SHIPPED | OUTPATIENT
Start: 2021-02-17 | End: 2021-05-05

## 2021-04-17 ENCOUNTER — HEALTH MAINTENANCE LETTER (OUTPATIENT)
Age: 36
End: 2021-04-17

## 2021-04-23 ENCOUNTER — MYC REFILL (OUTPATIENT)
Dept: FAMILY MEDICINE | Facility: CLINIC | Age: 36
End: 2021-04-23

## 2021-04-23 DIAGNOSIS — F41.1 GENERALIZED ANXIETY DISORDER: Chronic | ICD-10-CM

## 2021-04-23 RX ORDER — CITALOPRAM HYDROBROMIDE 10 MG/1
TABLET ORAL
Start: 2021-04-23

## 2021-05-03 NOTE — PATIENT INSTRUCTIONS
Gisele Rain Advanced Care Planning is a free service available through [employer].    Please visit [email or web address] or call [phone] to learn about and register for classes.    If you need an , please call your clinic to arrange for an individual appointment.    For other questions email [email or web address] or call [phone].      Preventive Health Recommendations  Female Ages 26 - 39  Yearly exam:   See your health care provider every year in order to    Review health changes.     Discuss preventive care.      Review your medicines if you your doctor has prescribed any.    Until age 30: Get a Pap test every three years (more often if you have had an abnormal result).    After age 30: Talk to your doctor about whether you should have a Pap test every 3 years or have a Pap test with HPV screening every 5 years.   You do not need a Pap test if your uterus was removed (hysterectomy) and you have not had cancer.  You should be tested each year for STDs (sexually transmitted diseases), if you're at risk.   Talk to your provider about how often to have your cholesterol checked.  If you are at risk for diabetes, you should have a diabetes test (fasting glucose).  Shots: Get a flu shot each year. Get a tetanus shot every 10 years.   Nutrition:     Eat at least 5 servings of fruits and vegetables each day.    Eat whole-grain bread, whole-wheat pasta and brown rice instead of white grains and rice.    Get adequate Calcium and Vitamin D.     Lifestyle    Exercise at least 150 minutes a week (30 minutes a day, 5 days of the week). This will help you control your weight and prevent disease.    Limit alcohol to one drink per day.    No smoking.     Wear sunscreen to prevent skin cancer.    See your dentist every six months for an exam and cleaning.

## 2021-05-05 ENCOUNTER — OFFICE VISIT (OUTPATIENT)
Dept: FAMILY MEDICINE | Facility: CLINIC | Age: 36
End: 2021-05-05
Payer: COMMERCIAL

## 2021-05-05 VITALS
HEIGHT: 65 IN | TEMPERATURE: 98.7 F | OXYGEN SATURATION: 98 % | SYSTOLIC BLOOD PRESSURE: 110 MMHG | DIASTOLIC BLOOD PRESSURE: 62 MMHG | WEIGHT: 145 LBS | BODY MASS INDEX: 24.16 KG/M2 | HEART RATE: 58 BPM

## 2021-05-05 DIAGNOSIS — Z71.89 ADVANCED DIRECTIVES, COUNSELING/DISCUSSION: ICD-10-CM

## 2021-05-05 DIAGNOSIS — Z30.41 ENCOUNTER FOR SURVEILLANCE OF CONTRACEPTIVE PILLS: ICD-10-CM

## 2021-05-05 DIAGNOSIS — F41.1 GENERALIZED ANXIETY DISORDER: Chronic | ICD-10-CM

## 2021-05-05 DIAGNOSIS — G47.10 ORGANIC HYPERSOMNIA: Chronic | ICD-10-CM

## 2021-05-05 DIAGNOSIS — Z00.00 ROUTINE GENERAL MEDICAL EXAMINATION AT A HEALTH CARE FACILITY: Primary | ICD-10-CM

## 2021-05-05 PROCEDURE — 99395 PREV VISIT EST AGE 18-39: CPT | Performed by: NURSE PRACTITIONER

## 2021-05-05 RX ORDER — CITALOPRAM HYDROBROMIDE 10 MG/1
TABLET ORAL
Qty: 270 TABLET | Refills: 3 | Status: SHIPPED | OUTPATIENT
Start: 2021-05-05 | End: 2021-05-12

## 2021-05-05 RX ORDER — DESOGESTREL AND ETHINYL ESTRADIOL 0.15-0.03
1 KIT ORAL DAILY
Qty: 84 TABLET | Refills: 3 | Status: SHIPPED | OUTPATIENT
Start: 2021-05-05 | End: 2022-06-23

## 2021-05-05 ASSESSMENT — ENCOUNTER SYMPTOMS
COUGH: 0
HEMATURIA: 0
FREQUENCY: 0
CONSTIPATION: 0
EYE PAIN: 0
NERVOUS/ANXIOUS: 0
DIARRHEA: 0
FEVER: 0
HEMATOCHEZIA: 0
DIZZINESS: 0
CHILLS: 0
ABDOMINAL PAIN: 0

## 2021-05-05 ASSESSMENT — MIFFLIN-ST. JEOR: SCORE: 1349.63

## 2021-05-05 NOTE — PROGRESS NOTES
SUBJECTIVE:   CC: Carrie Dalton is an 35 year old woman who presents for preventive health visit.     Patient has been advised of split billing requirements and indicates understanding: Yes     Healthy Habits:     Getting at least 3 servings of Calcium per day:  Yes    Bi-annual eye exam:  Yes    Dental care twice a year:  Yes    Sleep apnea or symptoms of sleep apnea:  Daytime drowsiness    Diet:  Regular (no restrictions)    Frequency of exercise:  6-7 days/week    Duration of exercise:  45-60 minutes    Taking medications regularly:  Yes    Medication side effects:  None    PHQ-2 Total Score: 0    Additional concerns today:  No    -Anxiety well controlled on Citalopram  -No longer on stimulants for hypersomnia due to cost but managing with caffeine, naps, avoiding prolonged driving, etc.        Today's PHQ-2 Score:   PHQ-2 ( 1999 Pfizer) 5/5/2021   Q1: Little interest or pleasure in doing things 0   Q2: Feeling down, depressed or hopeless 0   PHQ-2 Score 0   Q1: Little interest or pleasure in doing things Not at all   Q2: Feeling down, depressed or hopeless Not at all   PHQ-2 Score 0       Abuse: Current or Past (Physical, Sexual or Emotional) - No  Do you feel safe in your environment? Yes    Have you ever done Advance Care Planning? (For example, a Health Directive, POLST, or a discussion with a medical provider or your loved ones about your wishes):YES      Social History     Tobacco Use     Smoking status: Never Smoker     Smokeless tobacco: Never Used     Tobacco comment: lives in non smoking household   Substance Use Topics     Alcohol use: Yes         Alcohol Use 5/5/2021   Prescreen: >3 drinks/day or >7 drinks/week? No   Prescreen: >3 drinks/day or >7 drinks/week? -       Reviewed orders with patient.  Reviewed health maintenance and updated orders accordingly - Yes  Labs reviewed in EPIC    Breast Cancer Screening:    Breast CA Risk Assessment (FHS-7) 5/5/2021   Do you have a family history of  "breast, colon, or ovarian cancer? No / Unknown         Patient under 40 years of age: Routine Mammogram Screening not recommended.   Pertinent mammograms are reviewed under the imaging tab.    History of abnormal Pap smear: NO - age 30-65 PAP every 5 years with negative HPV co-testing recommended  PAP / HPV Latest Ref Rng & Units 7/24/2017 12/5/2012 1/2/2012   PAP - NIL NIL NIL   HPV 16 DNA NEG Negative - -   HPV 18 DNA NEG Negative - -   OTHER HR HPV NEG Negative - -     Reviewed and updated as needed this visit by clinical staff  Tobacco  Allergies  Meds              Reviewed and updated as needed this visit by Provider                    Review of Systems   Constitutional: Negative for chills and fever.   HENT: Negative for congestion and ear pain.    Eyes: Negative for pain.   Respiratory: Negative for cough.    Cardiovascular: Negative for chest pain.   Gastrointestinal: Negative for abdominal pain, constipation, diarrhea and hematochezia.   Genitourinary: Negative for frequency, genital sores and hematuria.   Neurological: Negative for dizziness.   Psychiatric/Behavioral: The patient is not nervous/anxious.           OBJECTIVE:   /62 (BP Location: Right arm, Patient Position: Chair, Cuff Size: Adult Regular)   Pulse 58   Temp 98.7  F (37.1  C) (Oral)   Ht 1.645 m (5' 4.75\")   Wt 65.8 kg (145 lb)   LMP 04/10/2021   SpO2 98%   BMI 24.32 kg/m    Physical Exam  GENERAL: healthy, alert and no distress  EYES: Eyes grossly normal to inspection, PERRL and conjunctivae and sclerae normal  HENT: ear canals and TM's normal, nose and mouth without ulcers or lesions  NECK: no adenopathy, no asymmetry, masses, or scars and thyroid normal to palpation  RESP: lungs clear to auscultation - no rales, rhonchi or wheezes  CV: regular rate and rhythm, normal S1 S2, no S3 or S4, no murmur, click or rub, no peripheral edema and peripheral pulses strong  ABDOMEN: soft, nontender, no hepatosplenomegaly, no masses and " "bowel sounds normal  MS: no gross musculoskeletal defects noted, no edema  SKIN: no suspicious lesions or rashes  NEURO: Normal strength and tone, mentation intact and speech normal  PSYCH: mentation appears normal, affect normal/bright    Diagnostic Test Results:  Labs reviewed in Epic  No results found for this or any previous visit (from the past 24 hour(s)).    ASSESSMENT/PLAN:   1. Routine general medical examination at a health care facility    - REVIEW OF HEALTH MAINTENANCE PROTOCOL ORDERS    2. Generalized anxiety disorder  Well controlled, continue medications without change  - citalopram (CELEXA) 10 MG tablet; TAKE 3 TABLETS(30 MG) BY MOUTH DAILY  Dispense: 270 tablet; Refill: 3    3. Encounter for surveillance of contraceptive pills  Doing well  - desogestrel-ethinyl estradiol (APRI) 0.15-30 MG-MCG tablet; Take 1 tablet by mouth daily  Dispense: 84 tablet; Refill: 3    4. Organic hypersomnia  Managing with lifestyle as medications are too costly    5. Advanced directives, counseling/discussion        Patient has been advised of split billing requirements and indicates understanding: No  COUNSELING:  Reviewed preventive health counseling, as reflected in patient instructions       Regular exercise       Healthy diet/nutrition       Contraception       Family planning       Osteoporosis prevention/bone health       Advance Care Planning    Estimated body mass index is 24.32 kg/m  as calculated from the following:    Height as of this encounter: 1.645 m (5' 4.75\").    Weight as of this encounter: 65.8 kg (145 lb).        She reports that she has never smoked. She has never used smokeless tobacco.      Counseling Resources:  ATP IV Guidelines  Pooled Cohorts Equation Calculator  Breast Cancer Risk Calculator  BRCA-Related Cancer Risk Assessment: FHS-7 Tool  FRAX Risk Assessment  ICSI Preventive Guidelines  Dietary Guidelines for Americans, 2010  USDA's MyPlate  ASA Prophylaxis  Lung CA Screening    Rylie " KANDACE Mooney CNP  [facility]

## 2021-05-07 ENCOUNTER — TELEPHONE (OUTPATIENT)
Dept: FAMILY MEDICINE | Facility: CLINIC | Age: 36
End: 2021-05-07

## 2021-05-07 NOTE — TELEPHONE ENCOUNTER
Prior Authorization Retail Medication Request    Medication/Dose: Citalopram 10mg tab   ICD code (if different than what is on RX):  Generalized anxiety disorder [F41.1]   Previously Tried and Failed:    Rationale:      Insurance Name:    Insurance ID:        Pharmacy Information (if different than what is on RX)  Name:  Christopher sanchez    Phone:  [phone]

## 2021-05-11 NOTE — TELEPHONE ENCOUNTER
PA Initiation    Medication: citalopram (CELEXA) 10 MG tablet -   Insurance Company: [employer] [place] - Phone [phone] Fax [phone]  Pharmacy Filling the Rx: [id number] - [place], [place] - [address]  Filling Pharmacy Phone: [phone]  Filling Pharmacy Fax: [phone]  Start Date: 5/11/2021

## 2021-05-12 ENCOUNTER — MYC MEDICAL ADVICE (OUTPATIENT)
Dept: FAMILY MEDICINE | Facility: CLINIC | Age: 36
End: 2021-05-12

## 2021-05-12 DIAGNOSIS — F41.1 GENERALIZED ANXIETY DISORDER: Chronic | ICD-10-CM

## 2021-05-12 RX ORDER — CITALOPRAM HYDROBROMIDE 20 MG/1
TABLET ORAL
Qty: 90 TABLET | Refills: 3 | Status: SHIPPED | OUTPATIENT
Start: 2021-05-12 | End: 2022-02-25

## 2021-05-13 NOTE — TELEPHONE ENCOUNTER
"PRIOR AUTHORIZATION DENIED    Medication: citalopram (CELEXA) 10 MG tablet -     Denial Date: 5/11/2021    Denial Rational:         Appeal Information:     **Please advise if appeal is necessary and place a letter of medical necessity with clinical rationale under the \"letters\" tab in patient's chart and route back to [facility] [[id number]]          "

## 2021-09-26 ENCOUNTER — HEALTH MAINTENANCE LETTER (OUTPATIENT)
Age: 36
End: 2021-09-26

## 2022-02-24 DIAGNOSIS — F41.1 GENERALIZED ANXIETY DISORDER: Chronic | ICD-10-CM

## 2022-02-25 RX ORDER — CITALOPRAM HYDROBROMIDE 20 MG/1
TABLET ORAL
Qty: 90 TABLET | Refills: 0 | Status: SHIPPED | OUTPATIENT
Start: 2022-02-25 | End: 2022-05-30

## 2022-02-25 NOTE — TELEPHONE ENCOUNTER
Prescription approved per [facility] [place].    Kim Webb RN

## 2022-05-30 ENCOUNTER — MYC REFILL (OUTPATIENT)
Dept: FAMILY MEDICINE | Facility: CLINIC | Age: 37
End: 2022-05-30
Payer: COMMERCIAL

## 2022-05-30 DIAGNOSIS — F41.1 GENERALIZED ANXIETY DISORDER: Chronic | ICD-10-CM

## 2022-06-01 RX ORDER — CITALOPRAM HYDROBROMIDE 20 MG/1
TABLET ORAL
Qty: 90 TABLET | Refills: 0 | Status: SHIPPED | OUTPATIENT
Start: 2022-06-01 | End: 2022-06-23

## 2022-06-01 NOTE — TELEPHONE ENCOUNTER
"Requested Prescriptions   Signed Prescriptions Disp Refills    citalopram (CELEXA) 20 MG tablet 90 tablet 0     Sig: TAKE 1 TABLET(20 MG) BY MOUTH DAILY       SSRIs Protocol Passed - 5/31/2022  5:08 PM        Passed - Recent (12 mo) or future (30 days) visit within the authorizing provider's specialty     Patient has had an office visit with the authorizing provider or a provider within the authorizing providers department within the previous 12 mos or has a future within next 30 days. See \"Patient Info\" tab in inbasket, or \"Choose Columns\" in Meds & Orders section of the refill encounter.              Passed - Medication is active on med list        Passed - Patient is age 18 or older        Passed - No active pregnancy on record        Passed - No positive pregnancy test in last 12 months           Elena Howard RN  [facility]     "

## 2022-06-23 ENCOUNTER — OFFICE VISIT (OUTPATIENT)
Dept: FAMILY MEDICINE | Facility: CLINIC | Age: 37
End: 2022-06-23
Payer: COMMERCIAL

## 2022-06-23 VITALS
OXYGEN SATURATION: 100 % | TEMPERATURE: 99.8 F | DIASTOLIC BLOOD PRESSURE: 72 MMHG | BODY MASS INDEX: 24.99 KG/M2 | HEIGHT: 65 IN | WEIGHT: 150 LBS | HEART RATE: 70 BPM | SYSTOLIC BLOOD PRESSURE: 114 MMHG

## 2022-06-23 DIAGNOSIS — G47.10 ORGANIC HYPERSOMNIA: ICD-10-CM

## 2022-06-23 DIAGNOSIS — F41.1 GENERALIZED ANXIETY DISORDER: Chronic | ICD-10-CM

## 2022-06-23 DIAGNOSIS — Z11.59 NEED FOR HEPATITIS C SCREENING TEST: ICD-10-CM

## 2022-06-23 DIAGNOSIS — Z00.00 ROUTINE GENERAL MEDICAL EXAMINATION AT A HEALTH CARE FACILITY: Primary | ICD-10-CM

## 2022-06-23 DIAGNOSIS — Z12.4 CERVICAL CANCER SCREENING: ICD-10-CM

## 2022-06-23 DIAGNOSIS — Z30.41 ENCOUNTER FOR SURVEILLANCE OF CONTRACEPTIVE PILLS: ICD-10-CM

## 2022-06-23 LAB
CHOLEST SERPL-MCNC: 232 MG/DL
FASTING STATUS PATIENT QL REPORTED: NO
HCV AB SERPL QL IA: NONREACTIVE
HDLC SERPL-MCNC: 88 MG/DL
LDLC SERPL CALC-MCNC: 129 MG/DL
NONHDLC SERPL-MCNC: 144 MG/DL
TRIGL SERPL-MCNC: 74 MG/DL

## 2022-06-23 PROCEDURE — 86803 HEPATITIS C AB TEST: CPT | Performed by: NURSE PRACTITIONER

## 2022-06-23 PROCEDURE — 80061 LIPID PANEL: CPT | Performed by: NURSE PRACTITIONER

## 2022-06-23 PROCEDURE — G0145 SCR C/V CYTO,THINLAYER,RESCR: HCPCS | Performed by: NURSE PRACTITIONER

## 2022-06-23 PROCEDURE — 36415 COLL VENOUS BLD VENIPUNCTURE: CPT | Performed by: NURSE PRACTITIONER

## 2022-06-23 PROCEDURE — 99395 PREV VISIT EST AGE 18-39: CPT | Performed by: NURSE PRACTITIONER

## 2022-06-23 PROCEDURE — 87624 HPV HI-RISK TYP POOLED RSLT: CPT | Performed by: NURSE PRACTITIONER

## 2022-06-23 RX ORDER — CITALOPRAM HYDROBROMIDE 20 MG/1
TABLET ORAL
Qty: 90 TABLET | Refills: 0 | Status: SHIPPED | OUTPATIENT
Start: 2022-06-23 | End: 2022-09-02

## 2022-06-23 RX ORDER — DESOGESTREL AND ETHINYL ESTRADIOL 0.15-0.03
1 KIT ORAL DAILY
Qty: 84 TABLET | Refills: 3 | Status: SHIPPED | OUTPATIENT
Start: 2022-06-23

## 2022-06-23 ASSESSMENT — ENCOUNTER SYMPTOMS
PARESTHESIAS: 0
DIZZINESS: 0
HEADACHES: 0
ARTHRALGIAS: 0
HEMATURIA: 0
PALPITATIONS: 0
HEMATOCHEZIA: 0
FREQUENCY: 0
NAUSEA: 0
DIARRHEA: 0
FEVER: 0
WEAKNESS: 0
DYSURIA: 0
SORE THROAT: 0
COUGH: 0
NERVOUS/ANXIOUS: 0
SHORTNESS OF BREATH: 0
MYALGIAS: 0
BREAST MASS: 0
HEARTBURN: 0
ABDOMINAL PAIN: 0
JOINT SWELLING: 0
CHILLS: 0
CONSTIPATION: 0
EYE PAIN: 0

## 2022-06-23 NOTE — PROGRESS NOTES
SUBJECTIVE:   CC: Carrie Dalton is an 37 year old woman who presents for preventive health visit.     Patient has been advised of split billing requirements and indicates understanding: Yes     Healthy Habits:     Getting at least 3 servings of Calcium per day:  Yes    Bi-annual eye exam:  Yes    Dental care twice a year:  Yes    Sleep apnea or symptoms of sleep apnea:  Daytime drowsiness    Diet:  Regular (no restrictions)    Duration of exercise:  45-60 minutes    Taking medications regularly:  Yes    Medication side effects:  Not applicable    PHQ-2 Total Score: 0    Additional concerns today:  No        Today's PHQ-2 Score:   PHQ-2 ( 1999 Pfizer) 6/23/2022   Q1: Little interest or pleasure in doing things 0   Q2: Feeling down, depressed or hopeless 0   PHQ-2 Score 0   PHQ-2 Total Score (12-17 Years)- Positive if 3 or more points; Administer PHQ-A if positive -   Q1: Little interest or pleasure in doing things Not at all   Q2: Feeling down, depressed or hopeless Not at all   PHQ-2 Score 0       Abuse: Current or Past (Physical, Sexual or Emotional) - No  Do you feel safe in your environment? Yes        Social History     Tobacco Use     Smoking status: Never Smoker     Smokeless tobacco: Never Used     Tobacco comment: lives in non smoking household   Substance Use Topics     Alcohol use: Yes       Alcohol Use 6/23/2022   Prescreen: >3 drinks/day or >7 drinks/week? No   Prescreen: >3 drinks/day or >7 drinks/week? -       Reviewed orders with patient.  Reviewed health maintenance and updated orders accordingly - Yes      Breast Cancer Screening:    Breast CA Risk Assessment (FHS-7) 5/5/2021   Do you have a family history of breast, colon, or ovarian cancer? No / Unknown         Patient under 40 years of age: Routine Mammogram Screening not recommended.   Pertinent mammograms are reviewed under the imaging tab.    History of abnormal Pap smear: NO - age 30-65 PAP every 5 years with negative HPV co-testing  "recommended  PAP / HPV Latest Ref Rng & Units 7/24/2017 12/5/2012 1/2/2012   PAP (Historical) - NIL NIL NIL   HPV16 NEG Negative - -   HPV18 NEG Negative - -   HRHPV NEG Negative - -     Reviewed and updated as needed this visit by clinical staff   Tobacco  Allergies  Meds                Reviewed and updated as needed this visit by Provider                   Reports her mood has been really good lately. Her anxiety is well managed on the Celexa.   She had tried Concerta in the past for hypersomnia and saw sleep medicine. She is managing well without medication at this time.     Review of Systems   Constitutional: Negative for chills and fever.   HENT: Negative for congestion, ear pain, hearing loss and sore throat.    Eyes: Negative for pain and visual disturbance.   Respiratory: Negative for cough and shortness of breath.    Cardiovascular: Negative for chest pain, palpitations and peripheral edema.   Gastrointestinal: Negative for abdominal pain, constipation, diarrhea, heartburn, hematochezia and nausea.   Breasts:  Negative for tenderness, breast mass and discharge.   Genitourinary: Negative for dysuria, frequency, genital sores, hematuria, pelvic pain, urgency, vaginal bleeding and vaginal discharge.   Musculoskeletal: Negative for arthralgias, joint swelling and myalgias.   Skin: Negative for rash.   Neurological: Negative for dizziness, weakness, headaches and paresthesias.   Psychiatric/Behavioral: Negative for mood changes. The patient is not nervous/anxious.         OBJECTIVE:   /72 (BP Location: Right arm, Patient Position: Right side, Cuff Size: Adult Regular)   Pulse 70   Temp 99.8  F (37.7  C) (Oral)   Ht 1.638 m (5' 4.5\")   Wt 68 kg (150 lb)   LMP 05/20/2022   SpO2 100%   BMI 25.35 kg/m    Physical Exam  GENERAL: healthy, alert and no distress  EYES: Eyes grossly normal to inspection, PERRL and conjunctivae and sclerae normal  HENT: ear canals and TM's normal, nose and mouth without " ulcers or lesions  NECK: no adenopathy, no asymmetry, masses, or scars and thyroid normal to palpation  RESP: lungs clear to auscultation - no rales, rhonchi or wheezes  BREAST: normal without masses, tenderness or nipple discharge and no palpable axillary masses or adenopathy  CV: regular rate and rhythm, normal S1 S2, no S3 or S4, no murmur, click or rub, no peripheral edema and peripheral pulses strong  ABDOMEN: soft, nontender, no hepatosplenomegaly, no masses and bowel sounds normal   (female): normal female external genitalia, normal urethral meatus, vaginal mucosa pink, moist, well rugated, and normal cervix/adnexa/uterus without masses or discharge  MS: no gross musculoskeletal defects noted, no edema  SKIN: no suspicious lesions or rashes  NEURO: Normal strength and tone, mentation intact and speech normal  PSYCH: mentation appears normal, affect normal/bright    ASSESSMENT/PLAN:   Carrie was seen today for physical.    Diagnoses and all orders for this visit:    Routine general medical examination at a health care facility  -     Lipid panel reflex to direct LDL Fasting; Future  -     Lipid panel reflex to direct LDL Fasting    Generalized anxiety disorder  Stable; continue current treatment plan and medications.  -     citalopram (CELEXA) 20 MG tablet; TAKE 1 TABLET(20 MG) BY MOUTH DAILY    Need for hepatitis C screening test  -     Hepatitis C Screen Reflex to HCV RNA Quant and Genotype; Future  -     Hepatitis C Screen Reflex to HCV RNA Quant and Genotype    Cervical cancer screening  -     Pap Screen with HPV - recommended age 30 - 65 years    Encounter for surveillance of contraceptive pills  -     desogestrel-ethinyl estradiol (APRI) 0.15-30 MG-MCG tablet; Take 1 tablet by mouth daily    Organic hypersomnia  Discussed other medication options such as Modafinil, available for hypersomnia. She feels like she is managing it well at this time but could consider it for future use- would refer back to  "sleep medicine for this.    Other orders  -     REVIEW OF HEALTH MAINTENANCE PROTOCOL ORDERS        Patient has been advised of split billing requirements and indicates understanding: Yes    COUNSELING:  Reviewed preventive health counseling, as reflected in patient instructions       Regular exercise       Healthy diet/nutrition       Alcohol Use       Contraception       Osteoporosis prevention/bone health    Estimated body mass index is 25.35 kg/m  as calculated from the following:    Height as of this encounter: 1.638 m (5' 4.5\").    Weight as of this encounter: 68 kg (150 lb).        She reports that she has never smoked. She has never used smokeless tobacco.      Counseling Resources:  ATP IV Guidelines  Pooled Cohorts Equation Calculator  Breast Cancer Risk Calculator  BRCA-Related Cancer Risk Assessment: FHS-7 Tool  FRAX Risk Assessment  ICSI Preventive Guidelines  Dietary Guidelines for Americans, 2010  USDA's MyPlate  ASA Prophylaxis  Lung CA Screening    Sarah Beth Phillips RN, APRN student    Resident/Fellow Attestation   I, KANDACE Choi CNP, was present with the medical/PAUL student who participated in the service and in the documentation of the note.  I have verified the history and personally performed the physical exam and medical decision making.  I agree with the assessment and plan of care as documented in the note.      KANDACE Choi CNP, APRN CNP  [facility]  "

## 2022-06-29 LAB
BKR LAB AP GYN ADEQUACY: NORMAL
BKR LAB AP GYN INTERPRETATION: NORMAL
BKR LAB AP HPV REFLEX: NORMAL
BKR LAB AP PREVIOUS ABNORMAL: NORMAL
PATH REPORT.COMMENTS IMP SPEC: NORMAL
PATH REPORT.COMMENTS IMP SPEC: NORMAL
PATH REPORT.RELEVANT HX SPEC: NORMAL

## 2022-06-30 LAB
HUMAN PAPILLOMA VIRUS 16 DNA: NEGATIVE
HUMAN PAPILLOMA VIRUS 18 DNA: NEGATIVE
HUMAN PAPILLOMA VIRUS FINAL DIAGNOSIS: NORMAL
HUMAN PAPILLOMA VIRUS OTHER HR: NEGATIVE

## 2023-01-08 ENCOUNTER — HEALTH MAINTENANCE LETTER (OUTPATIENT)
Age: 38
End: 2023-01-08

## 2023-06-15 ENCOUNTER — VIRTUAL VISIT (OUTPATIENT)
Dept: FAMILY MEDICINE | Facility: CLINIC | Age: 38
End: 2023-06-15
Payer: COMMERCIAL

## 2023-06-15 DIAGNOSIS — F41.1 GENERALIZED ANXIETY DISORDER: Chronic | ICD-10-CM

## 2023-06-15 PROCEDURE — 99213 OFFICE O/P EST LOW 20 MIN: CPT | Mod: 95 | Performed by: INTERNAL MEDICINE

## 2023-06-15 RX ORDER — CITALOPRAM HYDROBROMIDE 20 MG/1
20 TABLET ORAL DAILY
Qty: 30 TABLET | Refills: 1 | Status: SHIPPED | OUTPATIENT
Start: 2023-06-15 | End: 2023-07-14

## 2023-06-15 ASSESSMENT — ANXIETY QUESTIONNAIRES
1. FEELING NERVOUS, ANXIOUS, OR ON EDGE: SEVERAL DAYS
3. WORRYING TOO MUCH ABOUT DIFFERENT THINGS: SEVERAL DAYS
5. BEING SO RESTLESS THAT IT IS HARD TO SIT STILL: SEVERAL DAYS
8. IF YOU CHECKED OFF ANY PROBLEMS, HOW DIFFICULT HAVE THESE MADE IT FOR YOU TO DO YOUR WORK, TAKE CARE OF THINGS AT HOME, OR GET ALONG WITH OTHER PEOPLE?: SOMEWHAT DIFFICULT
7. FEELING AFRAID AS IF SOMETHING AWFUL MIGHT HAPPEN: NOT AT ALL
GAD7 TOTAL SCORE: 6
2. NOT BEING ABLE TO STOP OR CONTROL WORRYING: SEVERAL DAYS
4. TROUBLE RELAXING: SEVERAL DAYS
6. BECOMING EASILY ANNOYED OR IRRITABLE: SEVERAL DAYS
7. FEELING AFRAID AS IF SOMETHING AWFUL MIGHT HAPPEN: NOT AT ALL
IF YOU CHECKED OFF ANY PROBLEMS ON THIS QUESTIONNAIRE, HOW DIFFICULT HAVE THESE PROBLEMS MADE IT FOR YOU TO DO YOUR WORK, TAKE CARE OF THINGS AT HOME, OR GET ALONG WITH OTHER PEOPLE: SOMEWHAT DIFFICULT
GAD7 TOTAL SCORE: 6

## 2023-06-15 NOTE — PROGRESS NOTES
Cheryl Dalton is a 38 year old who is being evaluated via a billable video visit.      How would you like to obtain your AVS? MyChart  If the video visit is dropped, the invitation should be resent by: Text to cell phone: [phone]  Will anyone else be joining your video visit? No        Assessment & Plan     Carrie was seen today for  follow up.    Diagnoses and all orders for this visit:    Generalized anxiety disorder  -     citalopram (CELEXA) 20 MG tablet; Take 1 tablet (20 mg) by mouth daily    Other orders  -     REVIEW OF HEALTH MAINTENANCE PROTOCOL ORDERS       30 day of citalopram give. Follow up with primary care clinic as scheduled.         Elan Martinez MD PhD  [facility]   Cheryl Dalton is a 38 year old, presenting for the following health issues:   Follow Up        6/15/2023     4:02 PM   Additional Questions   Roomed by Kim     History of Present Illness       Mental Health Follow-up:  Patient presents to follow-up on Anxiety.    Patient's anxiety since last visit has been:  No change  The patient is not having other symptoms associated with anxiety.  Any significant life events: No  Patient is not feeling anxious or having panic attacks.  Patient has no concerns about alcohol or drug use.    She eats 4 or more servings of fruits and vegetables daily.She consumes 0 sweetened beverage(s) daily.She exercises with enough effort to increase her heart rate 60 or more minutes per day.  She exercises with enough effort to increase her heart rate 6 days per week.   She is taking medications regularly.  Today's ELIZABETH-7 Score: 6     New patient to this provider.   History of anxiety on citalopram 20 mg. Doing well with this. Her PCP left. She scheduled an appointment for preventive check up at her primary clinic tomorrow but was told it was too early per her insurance and she rescheduled it to July. Pt would like to have 1 month prescription so there is no gap. She has  no other concerns.       Review of Systems   Constitutional, HEENT, cardiovascular, pulmonary, gi and gu systems are negative, except as otherwise noted.      Objective           Vitals:  No vitals were obtained today due to virtual visit.    Physical Exam   GENERAL: Healthy, alert and no distress  EYES: Eyes grossly normal to inspection.  No discharge or erythema, or obvious scleral/conjunctival abnormalities.  RESP: No audible wheeze, cough, or visible cyanosis.  No visible retractions or increased work of breathing.    SKIN: Visible skin clear. No significant rash, abnormal pigmentation or lesions.  NEURO: Cranial nerves grossly intact.  Mentation and speech appropriate for age.  PSYCH: Mentation appears normal, affect normal/bright, judgement and insight intact, normal speech and appearance well-groomed.      Video-Visit Details    Type of service:  Video Visit     Originating Location (pt. Location): Home  Distant Location (provider location):  On-site  Platform used for Video Visit: [employer]

## 2023-07-13 ASSESSMENT — ENCOUNTER SYMPTOMS
SHORTNESS OF BREATH: 0
FEVER: 0
BREAST MASS: 0
HEMATURIA: 0
JOINT SWELLING: 0
DIZZINESS: 0
ABDOMINAL PAIN: 0
WEAKNESS: 0
MYALGIAS: 0
NERVOUS/ANXIOUS: 0
NAUSEA: 0
CONSTIPATION: 0
CHILLS: 0
DYSURIA: 0
ARTHRALGIAS: 0
SORE THROAT: 0
PARESTHESIAS: 0
HEMATOCHEZIA: 0
DIARRHEA: 0
COUGH: 0
HEADACHES: 0
EYE PAIN: 0
FREQUENCY: 0
HEARTBURN: 0
PALPITATIONS: 0

## 2023-07-14 ENCOUNTER — ANCILLARY PROCEDURE (OUTPATIENT)
Dept: GENERAL RADIOLOGY | Facility: CLINIC | Age: 38
End: 2023-07-14
Attending: NURSE PRACTITIONER
Payer: COMMERCIAL

## 2023-07-14 ENCOUNTER — OFFICE VISIT (OUTPATIENT)
Dept: FAMILY MEDICINE | Facility: CLINIC | Age: 38
End: 2023-07-14
Payer: COMMERCIAL

## 2023-07-14 VITALS
WEIGHT: 139 LBS | TEMPERATURE: 98.4 F | RESPIRATION RATE: 16 BRPM | BODY MASS INDEX: 23.16 KG/M2 | HEIGHT: 65 IN | HEART RATE: 84 BPM | SYSTOLIC BLOOD PRESSURE: 110 MMHG | OXYGEN SATURATION: 99 % | DIASTOLIC BLOOD PRESSURE: 64 MMHG

## 2023-07-14 DIAGNOSIS — Z00.00 HEALTHCARE MAINTENANCE: Primary | ICD-10-CM

## 2023-07-14 DIAGNOSIS — F41.1 GENERALIZED ANXIETY DISORDER: Chronic | ICD-10-CM

## 2023-07-14 DIAGNOSIS — M54.50 LUMBAR BACK PAIN: ICD-10-CM

## 2023-07-14 DIAGNOSIS — Z23 NEED FOR TETANUS BOOSTER: ICD-10-CM

## 2023-07-14 DIAGNOSIS — Z13.220 LIPID SCREENING: ICD-10-CM

## 2023-07-14 LAB
APO A-I SERPL-MCNC: 11 MG/DL
CHOLEST SERPL-MCNC: 189 MG/DL
HDLC SERPL-MCNC: 46 MG/DL
LDLC SERPL CALC-MCNC: 128 MG/DL
NONHDLC SERPL-MCNC: 143 MG/DL
TRIGL SERPL-MCNC: 74 MG/DL

## 2023-07-14 PROCEDURE — 83695 ASSAY OF LIPOPROTEIN(A): CPT | Performed by: NURSE PRACTITIONER

## 2023-07-14 PROCEDURE — 96127 BRIEF EMOTIONAL/BEHAV ASSMT: CPT | Performed by: NURSE PRACTITIONER

## 2023-07-14 PROCEDURE — 90471 IMMUNIZATION ADMIN: CPT | Performed by: NURSE PRACTITIONER

## 2023-07-14 PROCEDURE — 36415 COLL VENOUS BLD VENIPUNCTURE: CPT | Performed by: NURSE PRACTITIONER

## 2023-07-14 PROCEDURE — 72100 X-RAY EXAM L-S SPINE 2/3 VWS: CPT | Mod: TC | Performed by: RADIOLOGY

## 2023-07-14 PROCEDURE — 99213 OFFICE O/P EST LOW 20 MIN: CPT | Mod: 25 | Performed by: NURSE PRACTITIONER

## 2023-07-14 PROCEDURE — 82172 ASSAY OF APOLIPOPROTEIN: CPT | Mod: 90 | Performed by: NURSE PRACTITIONER

## 2023-07-14 PROCEDURE — 99000 SPECIMEN HANDLING OFFICE-LAB: CPT | Performed by: NURSE PRACTITIONER

## 2023-07-14 PROCEDURE — 80061 LIPID PANEL: CPT | Performed by: NURSE PRACTITIONER

## 2023-07-14 PROCEDURE — 90715 TDAP VACCINE 7 YRS/> IM: CPT | Performed by: NURSE PRACTITIONER

## 2023-07-14 PROCEDURE — 99395 PREV VISIT EST AGE 18-39: CPT | Mod: 25 | Performed by: NURSE PRACTITIONER

## 2023-07-14 RX ORDER — CITALOPRAM HYDROBROMIDE 20 MG/1
20 TABLET ORAL DAILY
Qty: 90 TABLET | Refills: 3 | Status: SHIPPED | OUTPATIENT
Start: 2023-07-14 | End: 2024-08-02

## 2023-07-14 ASSESSMENT — ENCOUNTER SYMPTOMS
JOINT SWELLING: 0
FEVER: 0
WEAKNESS: 0
EYE PAIN: 0
CHILLS: 0
ARTHRALGIAS: 0
NAUSEA: 0
ABDOMINAL PAIN: 0
PARESTHESIAS: 0
BREAST MASS: 0
FREQUENCY: 0
CONSTIPATION: 0
DIZZINESS: 0
DIARRHEA: 0
SORE THROAT: 0
COUGH: 0
DYSURIA: 0
PALPITATIONS: 0
HEADACHES: 0
HEARTBURN: 0
HEMATURIA: 0
HEMATOCHEZIA: 0
MYALGIAS: 0
SHORTNESS OF BREATH: 0
NERVOUS/ANXIOUS: 0

## 2023-07-14 ASSESSMENT — ANXIETY QUESTIONNAIRES
3. WORRYING TOO MUCH ABOUT DIFFERENT THINGS: NOT AT ALL
5. BEING SO RESTLESS THAT IT IS HARD TO SIT STILL: NOT AT ALL
GAD7 TOTAL SCORE: 3
1. FEELING NERVOUS, ANXIOUS, OR ON EDGE: SEVERAL DAYS
2. NOT BEING ABLE TO STOP OR CONTROL WORRYING: NOT AT ALL
IF YOU CHECKED OFF ANY PROBLEMS ON THIS QUESTIONNAIRE, HOW DIFFICULT HAVE THESE PROBLEMS MADE IT FOR YOU TO DO YOUR WORK, TAKE CARE OF THINGS AT HOME, OR GET ALONG WITH OTHER PEOPLE: NOT DIFFICULT AT ALL
GAD7 TOTAL SCORE: 3
6. BECOMING EASILY ANNOYED OR IRRITABLE: SEVERAL DAYS
7. FEELING AFRAID AS IF SOMETHING AWFUL MIGHT HAPPEN: NOT AT ALL

## 2023-07-14 ASSESSMENT — PATIENT HEALTH QUESTIONNAIRE - PHQ9: 5. POOR APPETITE OR OVEREATING: SEVERAL DAYS

## 2023-07-14 ASSESSMENT — PAIN SCALES - GENERAL: PAINLEVEL: EXTREME PAIN (8)

## 2023-07-14 NOTE — ASSESSMENT & PLAN NOTE
GAD7 6  Medication: Celexa 20 mg daily, refilled for 1 year  Lifestyle management eats healthy, exercises daily, has relaxation time with family, good relationships.

## 2023-07-14 NOTE — PROGRESS NOTES
SUBJECTIVE:   CC: Cheryl Dalton is an 38 year old who presents for preventive health visit.       6/15/2023     4:02 PM   Additional Questions   Roomed by Kim     Nutrition: typically eats healthy  Exercise/movement: lift weights daily, compound movements, isolated, is not doing. 6 times a week  Sleep: 7-8 hours of sleep, in between type, some back pain with   Stress: manage stress, self care: exercise, reading, has good relationships with spouse  Alcohol: 2-3 on the weekend night  Tobacco: none,  Drugs:none    Family history  Parents: healthy  Brother: Crohn's  MGM: 90s  MGF: lived long a time  PGM: ovarian cancer  PGF:  unknown    Back pain: 18 months,lower back pain waist like distribution with a little radiation into the buttock. Laying sitting standing always hurts. Standing desk at work, sitting hurts position changes.  no leg pain or radiculopathy. Has seen a chiropractor with 4 months. No physical therapy. No known trauma    Fevers, chills, body aches, nausea, stomach, headaches    anxiety    Healthy Habits:     Getting at least 3 servings of Calcium per day:  Yes    Bi-annual eye exam:  Yes    Dental care twice a year:  Yes    Sleep apnea or symptoms of sleep apnea:  Daytime drowsiness    Diet:  Regular (no restrictions)    Frequency of exercise:  6-7 days/week    Duration of exercise:  45-60 minutes    Taking medications regularly:  Yes    Barriers to taking medications:  None    Medication side effects:  Not applicable    Additional concerns today:  Yes    Back Pain  Duration of complaint: ongoing back pain - x 18 monthly         Social History     Tobacco Use     Smoking status: Never     Smokeless tobacco: Never     Tobacco comments:     lives in non smoking household   Substance Use Topics     Alcohol use: Yes             2023     1:24 PM   Alcohol Use   Prescreen: >3 drinks/day or >7 drinks/week? No         6/15/2023     9:42 AM   AUDIT - Alcohol Use Disorders Identification Test -  Reproduced from the World Health Organization Audit 2001 (Second Edition)   1.  How often do you have a drink containing alcohol? 2 to 3 times a week    2 to 3 times a week   2.  How many drinks containing alcohol do you have on a typical day when you are drinking? 3 or 4    3 or 4   3.  How often do you have five or more drinks on one occasion? Never    Never   4.  How often during the last year have you found that you were not able to stop drinking once you had started? Never    Never   5.  How often during the last year have you failed to do what was normally expected of you because of drinking? Never    Never   6.  How often during the last year have you needed a first drink in the morning to get yourself going after a heavy drinking session? Never    Never   7.  How often during the last year have you had a feeling of guilt or remorse after drinking? Never    Never   8.  How often during the last year have you been unable to remember what happened the night before because of your drinking? Never    Never   9.  Have you or someone else been injured because of your drinking? No    No   10. Has a relative, friend, doctor or other health care worker been concerned about your drinking or suggested you cut down? No    No   TOTAL SCORE 4    4     Reviewed orders with patient.  Reviewed health maintenance and updated orders accordingly - Yes  Lab work is in process    Breast Cancer Screening: no early screening        5/5/2021     4:04 PM   Breast CA Risk Assessment (FHS-7)   Do you have a family history of breast, colon, or ovarian cancer? No / Unknown       Pertinent mammograms are reviewed under the imaging tab.    History of abnormal Pap smear: NO - age 30-65 PAP every 5 years with negative HPV co-testing recommended      Latest Ref Rng & Units 6/23/2022    10:31 AM 7/24/2017     8:39 AM 7/24/2017     8:30 AM   PAP / HPV   PAP  Negative for Intraepithelial Lesion or Malignancy (NILM)      PAP (Historical)   NIL    "  HPV 16 DNA Negative Negative   Negative    HPV 18 DNA Negative Negative   Negative    Other HR HPV Negative Negative   Negative      Reviewed and updated as needed this visit by clinical staff                  Reviewed and updated as needed this visit by Provider                 Past Medical History:   Diagnosis Date     Conjunctivitis, allergic 2010      Past Surgical History:   Procedure Laterality Date     PE TUBES  3/1988     OB History    Para Term  AB Living   1 1 0 1 0 1   SAB IAB Ectopic Multiple Live Births   0 0 0 0 1      # Outcome Date GA Lbr Tl/2nd Weight Sex Delivery Anes PTL Lv   1  10/28/10 34w4d  2.041 kg (4 lb 8 oz) M    MICHAEL       Review of Systems   Constitutional: Negative for chills and fever.   HENT: Negative for congestion, ear pain, hearing loss and sore throat.    Eyes: Negative for pain and visual disturbance.   Respiratory: Negative for cough and shortness of breath.    Cardiovascular: Negative for chest pain, palpitations and peripheral edema.   Gastrointestinal: Negative for abdominal pain, constipation, diarrhea, heartburn, hematochezia and nausea.   Breasts:  Negative for tenderness, breast mass and discharge.   Genitourinary: Negative for dysuria, frequency, genital sores, hematuria, pelvic pain, urgency, vaginal bleeding and vaginal discharge.   Musculoskeletal: Negative for arthralgias, joint swelling and myalgias.   Skin: Negative for rash.   Neurological: Negative for dizziness, weakness, headaches and paresthesias.   Psychiatric/Behavioral: Negative for mood changes. The patient is not nervous/anxious.         OBJECTIVE:   /64 (BP Location: Right arm, Patient Position: Sitting, Cuff Size: Adult Regular)   Pulse 84   Temp 98.4  F (36.9  C) (Tympanic)   Resp 16   Ht 1.638 m (5' 4.5\")   Wt 63 kg (139 lb)   LMP 2023 (Approximate)   SpO2 99%   BMI 23.49 kg/m    Physical Exam  Constitutional:       Appearance: Normal appearance. "   HENT:      Head: Normocephalic.      Right Ear: Tympanic membrane, ear canal and external ear normal.      Left Ear: Tympanic membrane, ear canal and external ear normal.      Nose: Nose normal.      Mouth/Throat:      Mouth: Mucous membranes are moist.      Pharynx: Oropharynx is clear.      Tonsils: 0 on the right. 0 on the left.   Eyes:      Extraocular Movements: Extraocular movements intact.      Conjunctiva/sclera: Conjunctivae normal.      Pupils: Pupils are equal, round, and reactive to light.   Neck:      Thyroid: No thyroid mass, thyromegaly or thyroid tenderness.      Trachea: Trachea normal.   Cardiovascular:      Rate and Rhythm: Normal rate and regular rhythm.      Heart sounds: Normal heart sounds.   Pulmonary:      Effort: Pulmonary effort is normal.      Breath sounds: Normal breath sounds.   Abdominal:      General: Abdomen is flat. Bowel sounds are normal.   Musculoskeletal:         General: Normal range of motion.      Cervical back: Normal range of motion.   Lymphadenopathy:      Cervical: No cervical adenopathy.   Skin:     General: Skin is warm and dry.   Neurological:      Mental Status: She is alert and oriented to person, place, and time.   Psychiatric:         Mood and Affect: Mood normal.         Behavior: Behavior normal.         Thought Content: Thought content normal.         Judgment: Judgment normal.           ASSESSMENT/PLAN:   Carrie was seen today for physical and establish care.    Diagnoses and all orders for this visit:    Healthcare maintenance  Pap smear done 6/2022 negative, next due 2027  Vaccines: due for Tdap    Lumbar back pain  Chronic back pain for past 1.5 years, no known triggers, worked with chiropractor without relief. Some radiculopathy down left buttock. No paresthesia or red flag symptoms. Will start with x-ray of lumbar spine, follow up with spine specialist and PT  -     Physical Therapy Referral; Future  -     XR Lumbar Spine 2/3 Views; Future  -      Spine  Referral; Future    Need for tetanus booster  -     TDAP VACCINE (Adacel, Boostrix)    Generalized anxiety disorder  GAD7 6  Medication: Celexa 20 mg daily, refilled for 1 year  Lifestyle management eats healthy, exercises daily, has relaxation time with family, good relationships.   -     citalopram (CELEXA) 20 MG tablet; Take 1 tablet (20 mg) by mouth daily    Lipid screening  Recent Labs   Lab Test 06/23/22  1046 07/24/17  0845   CHOL 232* 196   HDL 88 98   * 87   TRIG 74 53     Was high last year, will check risk further with Lipoprotein (a), Apo B testing  Risks: none  Positives: healthy diet, normal weight, regular exercise, no family history of CAD, MI, no tobacco, minimal alcohol.      -     Lipid panel reflex to direct LDL Fasting; Future  -     Lipoprotein (a); Future  -     Apolipoprotein B; Future  -     Lipid panel reflex to direct LDL Fasting  -     Lipoprotein (a)  -     Apolipoprotein B      Patient has been advised of split billing requirements and indicates understanding: Yes      COUNSELING:  Counseled on healthy diet, exercise physical activity, stress management, alcohol usage, and sleep.        She reports that she has never smoked. She has never used smokeless tobacco.      KANDACE Louie CNP [facility]

## 2023-07-14 NOTE — ASSESSMENT & PLAN NOTE
Recent Labs   Lab Test 06/23/22  1046 07/24/17  0845   CHOL 232* 196   HDL 88 98   * 87   TRIG 74 53     Was high last year, will check risk further with Lipoprotein (a), Apo B testing  Risks: none  Positives: healthy diet, normal weight, regular exercise, no family history of CAD, MI, no tobacco, minimal alcohol.

## 2023-07-15 LAB — APO B100 SERPL-MCNC: 107 MG/DL

## 2023-08-09 ENCOUNTER — TELEPHONE (OUTPATIENT)
Dept: NEUROSURGERY | Facility: CLINIC | Age: 38
End: 2023-08-09

## 2023-08-09 NOTE — TELEPHONE ENCOUNTER
Patient returning call saying that clinic had called her regarding her upcoming appt on 8/31 stating that they need more information. Patient requesting call back and leaving message in [employer].

## 2023-08-30 ENCOUNTER — PRE VISIT (OUTPATIENT)
Dept: NEUROSURGERY | Facility: CLINIC | Age: 38
End: 2023-08-30
Payer: COMMERCIAL

## 2023-08-30 NOTE — TELEPHONE ENCOUNTER
NEUROSURGERY- NEW PREVISIT PLANNING       Record Status/Location     Referring Provider Referral   Gali Hanley APRN CNP      Diagnosis Referral Lumbar back pain    MRI (HEAD, NECK, SPINE) Na    CT Na    X-ray Pacs 7/14/23 [facility]    INJECTION Na    PHYSICAL THERAPY Na    SURGERY Na

## 2023-08-31 ENCOUNTER — OFFICE VISIT (OUTPATIENT)
Dept: NEUROSURGERY | Facility: CLINIC | Age: 38
End: 2023-08-31
Attending: NURSE PRACTITIONER
Payer: COMMERCIAL

## 2023-08-31 VITALS — SYSTOLIC BLOOD PRESSURE: 141 MMHG | HEART RATE: 86 BPM | OXYGEN SATURATION: 95 % | DIASTOLIC BLOOD PRESSURE: 90 MMHG

## 2023-08-31 DIAGNOSIS — M53.3 CHRONIC LEFT SI JOINT PAIN: ICD-10-CM

## 2023-08-31 DIAGNOSIS — M54.16 LUMBAR RADICULOPATHY: Primary | ICD-10-CM

## 2023-08-31 DIAGNOSIS — G89.29 CHRONIC LEFT SI JOINT PAIN: ICD-10-CM

## 2023-08-31 PROCEDURE — 99244 OFF/OP CNSLTJ NEW/EST MOD 40: CPT | Performed by: NURSE PRACTITIONER

## 2023-08-31 ASSESSMENT — PAIN SCALES - GENERAL: PAINLEVEL: EXTREME PAIN (9)

## 2023-08-31 NOTE — NURSING NOTE
"Carrie Dalton is a 38 year old female who presents for:  Chief Complaint   Patient presents with    Consult     Midline low back pain radiating into buttock on the left side, no numbness or tingling        Initial Vitals:  BP (!) 141/90   Pulse 86   LMP 06/30/2023 (Approximate)   SpO2 95%  Estimated body mass index is 23.49 kg/m  as calculated from the following:    Height as of 7/14/23: 5' 4.5\" (1.638 m).    Weight as of 7/14/23: 139 lb (63 kg).. There is no height or weight on file to calculate BSA. BP completed using cuff size: regular  Extreme Pain (9)    Nursing Comments:       Mila White    "

## 2023-08-31 NOTE — PATIENT INSTRUCTIONS
Order for lumbar spine MRI. You can call to schedule at [phone]. I will call with the results and next steps.     Please call our clinic if symptoms persist, change, or worsen at any time.    [facility]surgery  [address] [address]  Tel [phone]

## 2023-08-31 NOTE — LETTER
8/31/2023         RE: Carrie Dalton  [address]  [place] [address]        Dear Colleague,    Thank you for referring your patient, Carrie Dalton, to the [facility] [place]. Please see a copy of my visit note below.    [facility] Neurosurgery Clinic Visit      CC: low back pain     Primary Care Provider: Gali Hanley    Reason For Visit:   I was asked by Gali Fisher CNP to consult on the patient for: chronic lumbar back pain       HPI: Carrie Dalton is a 38 year old female who presents for evaluation of chronic low back pain. Symptoms started about 1-2 years ago. Denies any trauma or injuries at onset. Today, patient reports sharp midline low back pain that radiates to left buttocks. Pain is worsened with activity. Patient has tried chiro care, ice packs, and OTC pain medications. Denies any leg pain, numbness, weakness, falls, foot drop, saddle anesthesia, or bladder/bowel incontinence.     Current pain: 9/10     Past Medical History:   Diagnosis Date     Conjunctivitis, allergic 9/8/2010       Past Medical History reviewed with patient during visit.    Past Surgical History:   Procedure Laterality Date     PE TUBES  3/1988     Past Surgical History reviewed with patient during visit.    Current Outpatient Medications   Medication     citalopram (CELEXA) 20 MG tablet     desogestrel-ethinyl estradiol (APRI) 0.15-30 MG-MCG tablet     No current facility-administered medications for this visit.       Allergies   Allergen Reactions     Ceclor [Cefaclor] Rash       Social History     Socioeconomic History     Marital status:      Spouse name: None     Number of children: 0     Years of education: 14     Highest education level: None   Occupational History     Employer: [employer]   Tobacco Use     Smoking status: Never     Smokeless tobacco: Never     Tobacco comments:     lives in non smoking household   Substance and Sexual  Activity     Alcohol use: Yes     Drug use: No     Sexual activity: Never     Partners: Male   Other Topics Concern      Service No     Blood Transfusions No     Caffeine Concern No     Occupational Exposure No     Hobby Hazards No     Sleep Concern No     Stress Concern No     Weight Concern No     Special Diet No     Back Care No     Exercise Yes     Bike Helmet No     Seat Belt Yes     Self-Exams Yes       Family History   Problem Relation Age of Onset     Gastrointestinal Disease Brother         Crohn's     Cancer Paternal Grandfather      Diabetes Maternal Grandmother      Glaucoma Maternal Grandmother      Cancer - colorectal Paternal Grandmother 55     Cancer Paternal Grandmother      Cancer Maternal Uncle      Hypertension No family hx of      Cerebrovascular Disease No family hx of      Thyroid Disease No family hx of      Macular Degeneration No family hx of        ROS: 10 point ROS neg other than the symptoms noted above in the HPI.    Vital Signs: BP (!) 141/90   Pulse 86   LMP 06/30/2023 (Approximate)   SpO2 95%     Neurological Examination:  Awake  Alert  Oriented x 3  Speech clear    Motor exam:  Iliopsoas  (hip flexion)               Right: 5/5  Left:  5/5  Quadriceps  (knee extension)       Right:  5/5  Left:  5/5  Hamstrings  (knee flexion)            Right:  5/5  Left:  5/5  Gastroc Soleus  (PF)                          Right:  5/5  Left:  5/5  Tibialis Ant  (DF)                          Right:  5/5  Left:  5/5  EHL                          Right:  5/5  Left:  5/5         Sensation normal to BLE    Reflexes are 2+ in the patellar and Achilles. There is no clonus.     Musculoskeletal:  Gait: Able to stand from a seated position. Normal non-antalgic, non-myelopathic gait.   No tenderness of the spine or paraspinous muscles.  Negative SI joint tenderness bilaterally.  Positive left Ella.   Negative straight leg raise bilaterally.      Imaging:   LUMBAR SPINE TWO TO THREE VIEWS 7/14/2023  9:06 AM                                                           IMPRESSION: Five lumbar type vertebrae. Normal alignment. Vertebral  body heights normal. No fractures. No significant degenerative change.    Assessment/Plan:   38 year old female who presents for evaluation of chronic midline low back pain that radiates to left buttocks. Lumbar spine XR reviewed with patient and we discussed next steps.     - Lumbar spine MRI ordered for further evaluation  - Will call patient with results and next steps    Advised patient to call our clinic with any questions or concerns. Patient voiced understanding and agreement.      Caitlin Car CNP  [facility] [place] [address]  Tel [phone]  Pager [phone]        Again, thank you for allowing me to participate in the care of your patient.        Sincerely,        Caitlin Car, OC

## 2023-08-31 NOTE — PROGRESS NOTES
[facility] Neurosurgery Clinic Visit      CC: low back pain     Primary Care Provider: Gali Hanley    Reason For Visit:   I was asked by Gali Fisher CNP to consult on the patient for: chronic lumbar back pain       HPI: Carrie Dalton is a 38 year old female who presents for evaluation of chronic low back pain. Symptoms started about 1-2 years ago. Denies any trauma or injuries at onset. Today, patient reports sharp midline low back pain that radiates to left buttocks. Pain is worsened with activity. Patient has tried chiro care, ice packs, and OTC pain medications. Denies any leg pain, numbness, weakness, falls, foot drop, saddle anesthesia, or bladder/bowel incontinence.     Current pain: 9/10     Past Medical History:   Diagnosis Date    Conjunctivitis, allergic 9/8/2010       Past Medical History reviewed with patient during visit.    Past Surgical History:   Procedure Laterality Date    PE TUBES  3/1988     Past Surgical History reviewed with patient during visit.    Current Outpatient Medications   Medication    citalopram (CELEXA) 20 MG tablet    desogestrel-ethinyl estradiol (APRI) 0.15-30 MG-MCG tablet     No current facility-administered medications for this visit.       Allergies   Allergen Reactions    Ceclor [Cefaclor] Rash       Social History     Socioeconomic History    Marital status:      Spouse name: None    Number of children: 0    Years of education: 14    Highest education level: None   Occupational History     Employer: [employer]   Tobacco Use    Smoking status: Never    Smokeless tobacco: Never    Tobacco comments:     lives in non smoking household   Substance and Sexual Activity    Alcohol use: Yes    Drug use: No    Sexual activity: Never     Partners: Male   Other Topics Concern     Service No    Blood Transfusions No    Caffeine Concern No    Occupational Exposure No    Hobby Hazards No    Sleep Concern No    Stress Concern No    Weight  Concern No    Special Diet No    Back Care No    Exercise Yes    Bike Helmet No    Seat Belt Yes    Self-Exams Yes       Family History   Problem Relation Age of Onset    Gastrointestinal Disease Brother         Crohn's    Cancer Paternal Grandfather     Diabetes Maternal Grandmother     Glaucoma Maternal Grandmother     Cancer - colorectal Paternal Grandmother 55    Cancer Paternal Grandmother     Cancer Maternal Uncle     Hypertension No family hx of     Cerebrovascular Disease No family hx of     Thyroid Disease No family hx of     Macular Degeneration No family hx of        ROS: 10 point ROS neg other than the symptoms noted above in the HPI.    Vital Signs: BP (!) 141/90   Pulse 86   LMP 06/30/2023 (Approximate)   SpO2 95%     Neurological Examination:  Awake  Alert  Oriented x 3  Speech clear    Motor exam:  Iliopsoas  (hip flexion)               Right: 5/5  Left:  5/5  Quadriceps  (knee extension)       Right:  5/5  Left:  5/5  Hamstrings  (knee flexion)            Right:  5/5  Left:  5/5  Gastroc Soleus  (PF)                          Right:  5/5  Left:  5/5  Tibialis Ant  (DF)                          Right:  5/5  Left:  5/5  EHL                          Right:  5/5  Left:  5/5         Sensation normal to BLE    Reflexes are 2+ in the patellar and Achilles. There is no clonus.     Musculoskeletal:  Gait: Able to stand from a seated position. Normal non-antalgic, non-myelopathic gait.   No tenderness of the spine or paraspinous muscles.  Negative SI joint tenderness bilaterally.  Positive left Ella.   Negative straight leg raise bilaterally.      Imaging:   LUMBAR SPINE TWO TO THREE VIEWS 7/14/2023 9:06 AM                                                           IMPRESSION: Five lumbar type vertebrae. Normal alignment. Vertebral  body heights normal. No fractures. No significant degenerative change.    Assessment/Plan:   38 year old female who presents for evaluation of chronic midline low back pain that  radiates to left buttocks. Lumbar spine XR reviewed with patient and we discussed next steps.     - Lumbar spine MRI ordered for further evaluation  - Will call patient with results and next steps    Advised patient to call our clinic with any questions or concerns. Patient voiced understanding and agreement.      Caitlin Car [facility]surgery  [address] [address]  Tel [phone]  Pager [phone]

## 2023-09-07 ENCOUNTER — TELEPHONE (OUTPATIENT)
Dept: NEUROSURGERY | Facility: CLINIC | Age: 38
End: 2023-09-07

## 2023-09-07 NOTE — TELEPHONE ENCOUNTER
Alka from [facility] requesting provider to send in order for MRI Lumbar Spine for mutual patient. Patient requesting to get MRI done at [facility] in [place].    Fax# [phone]  CB# [phone]

## 2023-09-08 ENCOUNTER — TRANSFERRED RECORDS (OUTPATIENT)
Dept: HEALTH INFORMATION MANAGEMENT | Facility: CLINIC | Age: 38
End: 2023-09-08
Payer: COMMERCIAL

## 2023-09-12 ENCOUNTER — DOCUMENTATION ONLY (OUTPATIENT)
Dept: NEUROSURGERY | Facility: CLINIC | Age: 38
End: 2023-09-12
Payer: COMMERCIAL

## 2024-06-14 ENCOUNTER — PATIENT OUTREACH (OUTPATIENT)
Dept: CARE COORDINATION | Facility: CLINIC | Age: 39
End: 2024-06-14
Payer: COMMERCIAL

## 2024-06-17 PROBLEM — Z71.89 ADVANCED DIRECTIVES, COUNSELING/DISCUSSION: Status: RESOLVED | Noted: 2021-05-05 | Resolved: 2024-06-17

## 2024-08-01 DIAGNOSIS — F41.1 GENERALIZED ANXIETY DISORDER: Chronic | ICD-10-CM

## 2024-08-02 RX ORDER — CITALOPRAM HYDROBROMIDE 20 MG/1
20 TABLET ORAL DAILY
Qty: 90 TABLET | Refills: 3 | Status: SHIPPED | OUTPATIENT
Start: 2024-08-02

## 2024-08-08 SDOH — HEALTH STABILITY: PHYSICAL HEALTH: ON AVERAGE, HOW MANY DAYS PER WEEK DO YOU ENGAGE IN MODERATE TO STRENUOUS EXERCISE (LIKE A BRISK WALK)?: 5 DAYS

## 2024-08-08 SDOH — HEALTH STABILITY: PHYSICAL HEALTH: ON AVERAGE, HOW MANY MINUTES DO YOU ENGAGE IN EXERCISE AT THIS LEVEL?: 40 MIN

## 2024-08-08 ASSESSMENT — SOCIAL DETERMINANTS OF HEALTH (SDOH): HOW OFTEN DO YOU GET TOGETHER WITH FRIENDS OR RELATIVES?: ONCE A WEEK

## 2024-08-09 ENCOUNTER — OFFICE VISIT (OUTPATIENT)
Dept: FAMILY MEDICINE | Facility: CLINIC | Age: 39
End: 2024-08-09
Payer: COMMERCIAL

## 2024-08-09 VITALS
HEIGHT: 65 IN | RESPIRATION RATE: 16 BRPM | TEMPERATURE: 98.9 F | OXYGEN SATURATION: 100 % | HEART RATE: 68 BPM | WEIGHT: 137 LBS | BODY MASS INDEX: 22.82 KG/M2 | DIASTOLIC BLOOD PRESSURE: 78 MMHG | SYSTOLIC BLOOD PRESSURE: 120 MMHG

## 2024-08-09 DIAGNOSIS — L73.9 FOLLICULITIS: ICD-10-CM

## 2024-08-09 DIAGNOSIS — E78.00 PURE HYPERCHOLESTEROLEMIA: ICD-10-CM

## 2024-08-09 DIAGNOSIS — F41.1 GENERALIZED ANXIETY DISORDER: Chronic | ICD-10-CM

## 2024-08-09 DIAGNOSIS — Z00.00 HEALTHCARE MAINTENANCE: Primary | ICD-10-CM

## 2024-08-09 PROBLEM — E78.5 HYPERLIPIDEMIA LDL GOAL <100: Status: ACTIVE | Noted: 2024-08-09

## 2024-08-09 LAB
CHOLEST SERPL-MCNC: 217 MG/DL
FASTING STATUS PATIENT QL REPORTED: YES
FASTING STATUS PATIENT QL REPORTED: YES
GLUCOSE SERPL-MCNC: 90 MG/DL (ref 70–99)
HDLC SERPL-MCNC: 100 MG/DL
LDLC SERPL CALC-MCNC: 108 MG/DL
NONHDLC SERPL-MCNC: 117 MG/DL
TRIGL SERPL-MCNC: 44 MG/DL

## 2024-08-09 PROCEDURE — 82947 ASSAY GLUCOSE BLOOD QUANT: CPT | Performed by: NURSE PRACTITIONER

## 2024-08-09 PROCEDURE — 99000 SPECIMEN HANDLING OFFICE-LAB: CPT | Performed by: NURSE PRACTITIONER

## 2024-08-09 PROCEDURE — 99214 OFFICE O/P EST MOD 30 MIN: CPT | Mod: 25 | Performed by: NURSE PRACTITIONER

## 2024-08-09 PROCEDURE — 80061 LIPID PANEL: CPT | Performed by: NURSE PRACTITIONER

## 2024-08-09 PROCEDURE — 96127 BRIEF EMOTIONAL/BEHAV ASSMT: CPT | Performed by: NURSE PRACTITIONER

## 2024-08-09 PROCEDURE — 82172 ASSAY OF APOLIPOPROTEIN: CPT | Mod: 90 | Performed by: NURSE PRACTITIONER

## 2024-08-09 PROCEDURE — 99395 PREV VISIT EST AGE 18-39: CPT | Performed by: NURSE PRACTITIONER

## 2024-08-09 PROCEDURE — 36415 COLL VENOUS BLD VENIPUNCTURE: CPT | Performed by: NURSE PRACTITIONER

## 2024-08-09 RX ORDER — OMEGA-3/DHA/EPA/FISH OIL 60 MG-90MG
CAPSULE ORAL
COMMUNITY

## 2024-08-09 RX ORDER — PRENATAL VIT 91/IRON/FOLIC/DHA 28-975-200
COMBINATION PACKAGE (EA) ORAL 2 TIMES DAILY
Qty: 42 G | Refills: 1 | Status: SHIPPED | OUTPATIENT
Start: 2024-08-09 | End: 2024-08-09 | Stop reason: ALTCHOICE

## 2024-08-09 RX ORDER — MUPIROCIN 20 MG/G
OINTMENT TOPICAL 2 TIMES DAILY
Qty: 30 G | Refills: 0 | Status: SHIPPED | OUTPATIENT
Start: 2024-08-09

## 2024-08-09 ASSESSMENT — ANXIETY QUESTIONNAIRES
GAD7 TOTAL SCORE: 0
2. NOT BEING ABLE TO STOP OR CONTROL WORRYING: NOT AT ALL
5. BEING SO RESTLESS THAT IT IS HARD TO SIT STILL: NOT AT ALL
1. FEELING NERVOUS, ANXIOUS, OR ON EDGE: NOT AT ALL
7. FEELING AFRAID AS IF SOMETHING AWFUL MIGHT HAPPEN: NOT AT ALL
IF YOU CHECKED OFF ANY PROBLEMS ON THIS QUESTIONNAIRE, HOW DIFFICULT HAVE THESE PROBLEMS MADE IT FOR YOU TO DO YOUR WORK, TAKE CARE OF THINGS AT HOME, OR GET ALONG WITH OTHER PEOPLE: NOT DIFFICULT AT ALL
3. WORRYING TOO MUCH ABOUT DIFFERENT THINGS: NOT AT ALL
GAD7 TOTAL SCORE: 0
6. BECOMING EASILY ANNOYED OR IRRITABLE: NOT AT ALL

## 2024-08-09 ASSESSMENT — PATIENT HEALTH QUESTIONNAIRE - PHQ9
SUM OF ALL RESPONSES TO PHQ QUESTIONS 1-9: 0
5. POOR APPETITE OR OVEREATING: NOT AT ALL

## 2024-08-09 ASSESSMENT — PAIN SCALES - GENERAL: PAINLEVEL: NO PAIN (0)

## 2024-08-09 NOTE — PROGRESS NOTES
Preventive Care Visit  [facility] SUREKHA Hanley, KANDACE CNP, Nurse Practitioner  Aug 9, 2024      Assessment & Plan   Problem List Items Addressed This Visit       Generalized anxiety disorder (Chronic)       PHQ9: 0  GAD7: 0    Medication: Celexa 20 mg daily, refilled for 1 year  Lifestyle management eats healthy, exercises daily, has relaxation time with family, good relationships.          Healthcare maintenance - Primary     Pap smear done 6/2022 negative, next due 2027   Vaccines up to date  Contraception: Apri OBC         Pure hypercholesterolemia     Recent Labs   Lab Test 07/14/23  0851 06/23/22  1046   CHOL 189 232*   HDL 46* 88   * 129*   TRIG 74 74   Non HDL level 143 than 130 is considered high risk, proxy for LDL-P    Lipoprotein (a): 11 (LOW Risk)  Lipoprotein (a) (LP(a)): is a test that evaluates for genetic predisposition to making atherogenic lipid particles.   Reference ranges:  <30 is low risk  >30 is high risk    Apo B: 107  Apolipoprotein B (Apo B) is a test to evaluate for heart disease causing lipid particles   [facility] reference range for Apo B  Here are the reference ranges:  Males/Females > 18 years of age  Desirable <90  Above desirable: 90-99  Borderline High 100-119  High: 120-130  Very high > or = 140    The ASCVD Risk score (Francesca DK, et al., 2019) failed to calculate for the following reasons:    The 2019 ASCVD risk score is only valid for ages 40 to 79    Family History:  Family history of coronary artery disease, heart attack or stroke    Risks: None  Positives: Tobacco use, normotensive, normal weight, regular exercise, healthy diet no family history  Plan: Continue to monitor at this point given that she does not have any risks other than the elevated lipids.  She is taking a fish oil supplement.              Other Visit Diagnoses       Folliculitis        Relevant Medications    mupirocin (BACTROBAN) 2 % external ointment        Has small  erythematous macules in the genital region.  States that it is fairly itchy.  Will trial Bactroban first.  It does not look like an intertrigo fungal infection.  But could consider that cream if it is not improving with Bactroban.  She does shave that area.    Patient has been advised of split billing requirements and indicates understanding: Yes       Counseling  Appropriate preventive services were addressed with this patient via screening, questionnaire, or discussion as appropriate for fall prevention, nutrition, physical activity, Tobacco-use cessation, weight loss and cognition.  Checklist reviewing preventive services available has been given to the patient.  Reviewed patient's diet, addressing concerns and/or questions.     FUTURE APPOINTMENTS:       - Follow-up for annual visit or as needed      Subjective   Cheryl Dalton is a 39 year old, presenting for the following:  Physical         Health Care Directive  Patient does not have a Health Care Directive or Living Will: Discussed advance care planning with patient; information given to patient to review.    HPI  Back pain; herniated L5-S1 disc, injections in different place; Dr [facility]  Rash in the genital area  Nutrition: Healthy [employer] diet  Exercise/movement:running, lifting  Sleep sleeping pretty well  Stress: stressful job;   Alcohol: 3 in an episode  Tobacco: None  Drugs: None    Family history  update      8/8/2024   General Health   How would you rate your overall physical health? Good   Feel stress (tense, anxious, or unable to sleep) To some extent      (!) STRESS CONCERN      8/8/2024   Nutrition   Three or more servings of calcium each day? Yes   Diet: Regular (no restrictions)   How many servings of fruit and vegetables per day? (!) 2-3   How many sweetened beverages each day? 0-1            8/8/2024   Exercise   Days per week of moderate/strenous exercise 5 days   Average minutes spent exercising at this level 40 min             8/8/2024   Social Factors   Frequency of gathering with friends or relatives Once a week   Worry food won't last until get money to buy more No   Food not last or not have enough money for food? No   Do you have housing? (Housing is defined as stable permanent housing and does not include staying ouside in a car, in a tent, in an abandoned building, in an overnight shelter, or couch-surfing.) Yes   Are you worried about losing your housing? No   Lack of transportation? No   Unable to get utilities (heat,electricity)? No            8/8/2024   Dental   Dentist two times every year? Yes            8/8/2024   TB Screening   Were you born outside of the [place]? No            Today's PHQ-2 Score:       8/8/2024    10:57 AM   PHQ-2 ( 1999 [employer])   Q1: Little interest or pleasure in doing things 0   Q2: Feeling down, depressed or hopeless 0   PHQ-2 Score 0   Q1: Little interest or pleasure in doing things Not at all   Q2: Feeling down, depressed or hopeless Not at all   PHQ-2 Score 0           8/8/2024   Substance Use   Alcohol more than 3/day or more than 7/wk No   Do you use any other substances recreationally? No        Social History     Tobacco Use    Smoking status: Never    Smokeless tobacco: Never    Tobacco comments:     lives in non smoking household   Substance Use Topics    Alcohol use: Yes    Drug use: No       Mammogram Screening - Patient under 40 years of age: Routine Mammogram Screening not recommended.         8/8/2024   STI Screening   New sexual partner(s) since last STI/HIV test? No        History of abnormal Pap smear: No - age 30- 64 PAP with HPV every 5 years recommended        Latest Ref Rng & Units 6/23/2022    10:31 AM 7/24/2017     8:39 AM 7/24/2017     8:30 AM   PAP / HPV   PAP  Negative for Intraepithelial Lesion or Malignancy (NILM)      PAP (Historical)   NIL     HPV 16 DNA Negative Negative   Negative    HPV 18 DNA Negative Negative   Negative    Other HR HPV Negative Negative   Negative   "          2024   Contraception/Family Planning   Questions about contraception or family planning No           Reviewed and updated as needed this visit by Provider                    Past Medical History:   Diagnosis Date    Conjunctivitis, allergic 2010     Past Surgical History:   Procedure Laterality Date    PE TUBES  3/1988     OB History    Para Term  AB Living   1 1 0 1 0 1   SAB IAB Ectopic Multiple Live Births   0 0 0 0 1      # Outcome Date GA Lbr Tl/2nd Weight Sex Type Anes PTL Lv   1  10/28/10 34w4d  2.041 kg (4 lb 8 oz) M    MICHAEL         Review of Systems  CONSTITUTIONAL: NEGATIVE for fever, chills, change in weight  INTEGUMENTARY/SKIN: POSITIVE for rash generalized and genitalia  EYES: NEGATIVE for vision changes or irritation  ENT/MOUTH: NEGATIVE for ear, mouth and throat problems  RESP: NEGATIVE for significant cough or SOB  BREAST: NEGATIVE for masses, tenderness or discharge  CV: NEGATIVE for chest pain, palpitations or peripheral edema  GI: NEGATIVE for nausea, abdominal pain, heartburn, or change in bowel habits  : NEGATIVE for frequency, dysuria, or hematuria  MUSCULOSKELETAL: NEGATIVE for significant arthralgias or myalgia  NEURO: NEGATIVE for weakness, dizziness or paresthesias  ENDOCRINE: NEGATIVE for temperature intolerance, skin/hair changes  HEME: NEGATIVE for bleeding problems  PSYCHIATRIC: NEGATIVE for changes in mood or affect     Objective    Exam  /78 (BP Location: Right arm, Patient Position: Sitting, Cuff Size: Adult Regular)   Pulse 68   Temp 98.9  F (37.2  C) (Tympanic)   Resp 16   Ht 1.638 m (5' 4.5\")   Wt 62.1 kg (137 lb)   LMP 2024 (Exact Date)   SpO2 100%   BMI 23.15 kg/m     Estimated body mass index is 23.15 kg/m  as calculated from the following:    Height as of this encounter: 1.638 m (5' 4.5\").    Weight as of this encounter: 62.1 kg (137 lb).    Physical Exam  Constitutional:       Appearance: Normal appearance. "   HENT:      Head: Normocephalic.      Right Ear: Tympanic membrane, ear canal and external ear normal.      Left Ear: Tympanic membrane, ear canal and external ear normal.      Nose: Nose normal.      Mouth/Throat:      Mouth: Mucous membranes are moist.      Pharynx: Oropharynx is clear.      Comments: Mallampati score + 1  Eyes:      Extraocular Movements: Extraocular movements intact.      Conjunctiva/sclera: Conjunctivae normal.      Pupils: Pupils are equal, round, and reactive to light.   Neck:      Thyroid: No thyroid mass, thyromegaly or thyroid tenderness.      Trachea: Trachea normal.   Cardiovascular:      Rate and Rhythm: Normal rate and regular rhythm.      Heart sounds: Normal heart sounds.   Pulmonary:      Effort: Pulmonary effort is normal.      Breath sounds: Normal breath sounds.   Abdominal:      General: Abdomen is flat. Bowel sounds are normal.   Genitourinary:      Musculoskeletal:         General: Normal range of motion.      Cervical back: Normal range of motion.   Lymphadenopathy:      Cervical: No cervical adenopathy.   Skin:     General: Skin is warm and dry.   Neurological:      Mental Status: She is alert and oriented to person, place, and time.   Psychiatric:         Mood and Affect: Mood normal.         Behavior: Behavior normal.         Thought Content: Thought content normal.         Judgment: Judgment normal.               Signed Electronically by: KANDACE Louie CNP

## 2024-08-09 NOTE — ASSESSMENT & PLAN NOTE
PHQ9: 0  GAD7: 0    Medication: Celexa 20 mg daily, refilled for 1 year  Lifestyle management eats healthy, exercises daily, has relaxation time with family, good relationships.

## 2024-08-09 NOTE — ASSESSMENT & PLAN NOTE
Recent Labs   Lab Test 07/14/23  0851 06/23/22  1046   CHOL 189 232*   HDL 46* 88   * 129*   TRIG 74 74   Non HDL level 143 than 130 is considered high risk, proxy for LDL-P    Lipoprotein (a): 11 (LOW Risk)  Lipoprotein (a) (LP(a)): is a test that evaluates for genetic predisposition to making atherogenic lipid particles.   Reference ranges:  <30 is low risk  >30 is high risk    Apo B: 107  Apolipoprotein B (Apo B) is a test to evaluate for heart disease causing lipid particles   [facility] reference range for Apo B  Here are the reference ranges:  Males/Females > 18 years of age  Desirable <90  Above desirable: 90-99  Borderline High 100-119  High: 120-130  Very high > or = 140    The ASCVD Risk score (Francesca FLORIAN, et al., 2019) failed to calculate for the following reasons:    The 2019 ASCVD risk score is only valid for ages 40 to 79    Family History:  Family history of coronary artery disease, heart attack or stroke    Risks: None  Positives: Tobacco use, normotensive, normal weight, regular exercise, healthy diet no family history  Plan: Continue to monitor at this point given that she does not have any risks other than the elevated lipids.  She is taking a fish oil supplement.

## 2024-08-11 LAB — APO B100 SERPL-MCNC: 92 MG/DL

## 2025-05-14 DIAGNOSIS — F41.1 GENERALIZED ANXIETY DISORDER: Chronic | ICD-10-CM

## 2025-05-14 RX ORDER — CITALOPRAM HYDROBROMIDE 20 MG/1
20 TABLET ORAL DAILY
Qty: 90 TABLET | Refills: 3 | Status: SHIPPED | OUTPATIENT
Start: 2025-05-14

## 2025-05-14 ASSESSMENT — ANXIETY QUESTIONNAIRES
IF YOU CHECKED OFF ANY PROBLEMS ON THIS QUESTIONNAIRE, HOW DIFFICULT HAVE THESE PROBLEMS MADE IT FOR YOU TO DO YOUR WORK, TAKE CARE OF THINGS AT HOME, OR GET ALONG WITH OTHER PEOPLE: NOT DIFFICULT AT ALL
2. NOT BEING ABLE TO STOP OR CONTROL WORRYING: NOT AT ALL
7. FEELING AFRAID AS IF SOMETHING AWFUL MIGHT HAPPEN: NOT AT ALL
3. WORRYING TOO MUCH ABOUT DIFFERENT THINGS: NOT AT ALL
4. TROUBLE RELAXING: NOT AT ALL
GAD7 TOTAL SCORE: 2
8. IF YOU CHECKED OFF ANY PROBLEMS, HOW DIFFICULT HAVE THESE MADE IT FOR YOU TO DO YOUR WORK, TAKE CARE OF THINGS AT HOME, OR GET ALONG WITH OTHER PEOPLE?: NOT DIFFICULT AT ALL
GAD7 TOTAL SCORE: 2
5. BEING SO RESTLESS THAT IT IS HARD TO SIT STILL: NOT AT ALL
GAD7 TOTAL SCORE: 2
6. BECOMING EASILY ANNOYED OR IRRITABLE: SEVERAL DAYS
1. FEELING NERVOUS, ANXIOUS, OR ON EDGE: SEVERAL DAYS
7. FEELING AFRAID AS IF SOMETHING AWFUL MIGHT HAPPEN: NOT AT ALL

## 2025-05-14 ASSESSMENT — PATIENT HEALTH QUESTIONNAIRE - PHQ9
10. IF YOU CHECKED OFF ANY PROBLEMS, HOW DIFFICULT HAVE THESE PROBLEMS MADE IT FOR YOU TO DO YOUR WORK, TAKE CARE OF THINGS AT HOME, OR GET ALONG WITH OTHER PEOPLE: NOT DIFFICULT AT ALL
SUM OF ALL RESPONSES TO PHQ QUESTIONS 1-9: 1
SUM OF ALL RESPONSES TO PHQ QUESTIONS 1-9: 1

## 2025-05-14 NOTE — TELEPHONE ENCOUNTER
Requested Prescriptions   Pending Prescriptions Disp Refills    citalopram (CELEXA) 20 MG tablet [Pharmacy Med Name: CITALOPRAM HYDROBROMIDE TABS 20MG]  0       SSRIs Protocol Failed - 5/14/2025  2:04 PM        Failed - Medication is active on med list and the sig matches. RN to manually verify dose and sig if red X/fail.     If the protocol passes (green check), you do not need to verify med dose and sig.    A prescription matches if they are the same clinical intention.    For Example: once daily and every morning are the same.    The protocol can not identify upper and lower case letters as matching and will fail.     For Example: Take 1 tablet (50 mg) by mouth daily     TAKE 1 TABLET (50 MG) BY MOUTH DAILY    For all fails (red x), verify dose and sig.    If the refill does match what is on file, the RN can still proceed to approve the refill request.       If they do not match, route to the appropriate provider.             Failed - ELIZABETH-7 score of less than 5 in past 6 months.     Please review last ELIZABETH-7 score.       6/15/2023     2:46 PM 7/14/2023     7:50 AM 8/9/2024    11:01 AM   ELIZABETH-7 SCORE   Total Score 6 (mild anxiety)     Total Score 6 3 0             Passed - Recent (12 mo) or future (90 days) visit within the authorizing provider's specialty     The patient must have completed an in-person or virtual visit within the past 12 months or has a future visit scheduled within the next 90 days with the authorizing provider s specialty.  Urgent care and e-visits do not qualify as an office visit for this protocol.          Passed - Medication indicated for associated diagnosis     Medication is associated with one or more of the following diagnoses:              Anxiety             Bipolar  Depression  Obsessive-compulsive disorder             Panic disorder  Postmenopausal flushing             Premenstrual dysphoric disorder             Social phobia   Adjustment disorder with depressed mood   Mood  disorder   Adjustment disorder with anxious mood          Passed - Patient is age 18 or older        Passed - No active pregnancy on record        Passed - No positive pregnancy test in last 12 months          Julie Behrendt RN

## 2025-06-01 ENCOUNTER — HEALTH MAINTENANCE LETTER (OUTPATIENT)
Age: 40
End: 2025-06-01

## 2025-07-14 ENCOUNTER — PATIENT OUTREACH (OUTPATIENT)
Dept: CARE COORDINATION | Facility: CLINIC | Age: 40
End: 2025-07-14
Payer: COMMERCIAL

## 2025-08-05 SDOH — HEALTH STABILITY: PHYSICAL HEALTH: ON AVERAGE, HOW MANY DAYS PER WEEK DO YOU ENGAGE IN MODERATE TO STRENUOUS EXERCISE (LIKE A BRISK WALK)?: 6 DAYS

## 2025-08-05 SDOH — HEALTH STABILITY: PHYSICAL HEALTH: ON AVERAGE, HOW MANY MINUTES DO YOU ENGAGE IN EXERCISE AT THIS LEVEL?: 50 MIN

## 2025-08-05 ASSESSMENT — ANXIETY QUESTIONNAIRES
7. FEELING AFRAID AS IF SOMETHING AWFUL MIGHT HAPPEN: NOT AT ALL
GAD7 TOTAL SCORE: 6
5. BEING SO RESTLESS THAT IT IS HARD TO SIT STILL: SEVERAL DAYS
1. FEELING NERVOUS, ANXIOUS, OR ON EDGE: SEVERAL DAYS
GAD7 TOTAL SCORE: 6
7. FEELING AFRAID AS IF SOMETHING AWFUL MIGHT HAPPEN: NOT AT ALL
4. TROUBLE RELAXING: SEVERAL DAYS
3. WORRYING TOO MUCH ABOUT DIFFERENT THINGS: SEVERAL DAYS
6. BECOMING EASILY ANNOYED OR IRRITABLE: SEVERAL DAYS
2. NOT BEING ABLE TO STOP OR CONTROL WORRYING: SEVERAL DAYS
IF YOU CHECKED OFF ANY PROBLEMS ON THIS QUESTIONNAIRE, HOW DIFFICULT HAVE THESE PROBLEMS MADE IT FOR YOU TO DO YOUR WORK, TAKE CARE OF THINGS AT HOME, OR GET ALONG WITH OTHER PEOPLE: SOMEWHAT DIFFICULT
8. IF YOU CHECKED OFF ANY PROBLEMS, HOW DIFFICULT HAVE THESE MADE IT FOR YOU TO DO YOUR WORK, TAKE CARE OF THINGS AT HOME, OR GET ALONG WITH OTHER PEOPLE?: SOMEWHAT DIFFICULT
GAD7 TOTAL SCORE: 6

## 2025-08-05 ASSESSMENT — SOCIAL DETERMINANTS OF HEALTH (SDOH): HOW OFTEN DO YOU GET TOGETHER WITH FRIENDS OR RELATIVES?: ONCE A WEEK

## 2025-08-06 ENCOUNTER — OFFICE VISIT (OUTPATIENT)
Dept: FAMILY MEDICINE | Facility: CLINIC | Age: 40
End: 2025-08-06
Payer: COMMERCIAL

## 2025-08-06 VITALS
TEMPERATURE: 99.2 F | BODY MASS INDEX: 24.16 KG/M2 | HEIGHT: 65 IN | OXYGEN SATURATION: 98 % | SYSTOLIC BLOOD PRESSURE: 112 MMHG | WEIGHT: 145 LBS | HEART RATE: 80 BPM | RESPIRATION RATE: 16 BRPM | DIASTOLIC BLOOD PRESSURE: 72 MMHG

## 2025-08-06 DIAGNOSIS — Z00.00 ROUTINE GENERAL MEDICAL EXAMINATION AT A HEALTH CARE FACILITY: Primary | ICD-10-CM

## 2025-08-06 DIAGNOSIS — R53.82 CHRONIC FATIGUE: ICD-10-CM

## 2025-08-06 DIAGNOSIS — F41.1 GENERALIZED ANXIETY DISORDER: Chronic | ICD-10-CM

## 2025-08-06 DIAGNOSIS — E78.00 PURE HYPERCHOLESTEROLEMIA: ICD-10-CM

## 2025-08-06 DIAGNOSIS — Z30.41 ENCOUNTER FOR SURVEILLANCE OF CONTRACEPTIVE PILLS: ICD-10-CM

## 2025-08-06 DIAGNOSIS — Z00.00 HEALTHCARE MAINTENANCE: ICD-10-CM

## 2025-08-06 DIAGNOSIS — Z12.31 VISIT FOR SCREENING MAMMOGRAM: ICD-10-CM

## 2025-08-06 LAB
ALBUMIN SERPL BCG-MCNC: 4.5 G/DL (ref 3.5–5.2)
ALP SERPL-CCNC: 69 U/L (ref 40–150)
ALT SERPL W P-5'-P-CCNC: 18 U/L (ref 0–50)
ANION GAP SERPL CALCULATED.3IONS-SCNC: 11 MMOL/L (ref 7–15)
AST SERPL W P-5'-P-CCNC: 27 U/L (ref 0–45)
BILIRUB SERPL-MCNC: 0.8 MG/DL
BUN SERPL-MCNC: 19.8 MG/DL (ref 6–20)
CALCIUM SERPL-MCNC: 9.7 MG/DL (ref 8.8–10.4)
CHLORIDE SERPL-SCNC: 102 MMOL/L (ref 98–107)
CHOLEST SERPL-MCNC: 235 MG/DL
CREAT SERPL-MCNC: 0.82 MG/DL (ref 0.51–0.95)
EGFRCR SERPLBLD CKD-EPI 2021: >90 ML/MIN/1.73M2
ERYTHROCYTE [DISTWIDTH] IN BLOOD BY AUTOMATED COUNT: 12.1 % (ref 10–15)
FASTING STATUS PATIENT QL REPORTED: YES
FASTING STATUS PATIENT QL REPORTED: YES
GLUCOSE SERPL-MCNC: 96 MG/DL (ref 70–99)
HCO3 SERPL-SCNC: 26 MMOL/L (ref 22–29)
HCT VFR BLD AUTO: 40.5 % (ref 35–47)
HDLC SERPL-MCNC: 102 MG/DL
HGB BLD-MCNC: 13.6 G/DL (ref 11.7–15.7)
LDLC SERPL CALC-MCNC: 123 MG/DL
MCH RBC QN AUTO: 28.5 PG (ref 26.5–33)
MCHC RBC AUTO-ENTMCNC: 33.6 G/DL (ref 31.5–36.5)
MCV RBC AUTO: 85 FL (ref 78–100)
NONHDLC SERPL-MCNC: 133 MG/DL
PLATELET # BLD AUTO: 333 10E3/UL (ref 150–450)
POTASSIUM SERPL-SCNC: 5.1 MMOL/L (ref 3.4–5.3)
PROT SERPL-MCNC: 6.9 G/DL (ref 6.4–8.3)
RBC # BLD AUTO: 4.78 10E6/UL (ref 3.8–5.2)
SODIUM SERPL-SCNC: 139 MMOL/L (ref 135–145)
TRIGL SERPL-MCNC: 49 MG/DL
TSH SERPL DL<=0.005 MIU/L-ACNC: 0.6 UIU/ML (ref 0.3–4.2)
WBC # BLD AUTO: 5.9 10E3/UL (ref 4–11)

## 2025-08-06 PROCEDURE — 3078F DIAST BP <80 MM HG: CPT | Performed by: NURSE PRACTITIONER

## 2025-08-06 PROCEDURE — 80061 LIPID PANEL: CPT | Performed by: NURSE PRACTITIONER

## 2025-08-06 PROCEDURE — 99000 SPECIMEN HANDLING OFFICE-LAB: CPT | Performed by: NURSE PRACTITIONER

## 2025-08-06 PROCEDURE — 80053 COMPREHEN METABOLIC PANEL: CPT | Performed by: NURSE PRACTITIONER

## 2025-08-06 PROCEDURE — 1126F AMNT PAIN NOTED NONE PRSNT: CPT | Performed by: NURSE PRACTITIONER

## 2025-08-06 PROCEDURE — 85027 COMPLETE CBC AUTOMATED: CPT | Performed by: NURSE PRACTITIONER

## 2025-08-06 PROCEDURE — 36415 COLL VENOUS BLD VENIPUNCTURE: CPT | Performed by: NURSE PRACTITIONER

## 2025-08-06 PROCEDURE — 99213 OFFICE O/P EST LOW 20 MIN: CPT | Mod: 25 | Performed by: NURSE PRACTITIONER

## 2025-08-06 PROCEDURE — 3074F SYST BP LT 130 MM HG: CPT | Performed by: NURSE PRACTITIONER

## 2025-08-06 PROCEDURE — 84443 ASSAY THYROID STIM HORMONE: CPT | Performed by: NURSE PRACTITIONER

## 2025-08-06 PROCEDURE — 82172 ASSAY OF APOLIPOPROTEIN: CPT | Mod: 90 | Performed by: NURSE PRACTITIONER

## 2025-08-06 PROCEDURE — 99396 PREV VISIT EST AGE 40-64: CPT | Performed by: NURSE PRACTITIONER

## 2025-08-06 RX ORDER — DESOGESTREL AND ETHINYL ESTRADIOL 0.15-0.03
1 KIT ORAL DAILY
Qty: 84 TABLET | Refills: 3 | Status: SHIPPED | OUTPATIENT
Start: 2025-08-06

## 2025-08-06 RX ORDER — MODAFINIL 200 MG/1
TABLET ORAL
Qty: 32 TABLET | Refills: 0 | Status: SHIPPED | OUTPATIENT
Start: 2025-08-06 | End: 2025-09-08

## 2025-08-06 ASSESSMENT — PAIN SCALES - GENERAL: PAINLEVEL_OUTOF10: NO PAIN (0)

## 2025-08-07 ENCOUNTER — RESULTS FOLLOW-UP (OUTPATIENT)
Dept: FAMILY MEDICINE | Facility: CLINIC | Age: 40
End: 2025-08-07
Payer: COMMERCIAL

## 2025-08-07 ENCOUNTER — PATIENT OUTREACH (OUTPATIENT)
Dept: CARE COORDINATION | Facility: CLINIC | Age: 40
End: 2025-08-07
Payer: COMMERCIAL

## 2025-08-07 LAB — APO B100 SERPL-MCNC: 102 MG/DL

## 2025-08-08 ENCOUNTER — TELEPHONE (OUTPATIENT)
Dept: FAMILY MEDICINE | Facility: CLINIC | Age: 40
End: 2025-08-08
Payer: COMMERCIAL

## 2025-08-08 DIAGNOSIS — R53.82 CHRONIC FATIGUE: Primary | ICD-10-CM

## 2025-08-11 RX ORDER — METHYLPHENIDATE HYDROCHLORIDE 18 MG/1
18 TABLET ORAL EVERY MORNING
Qty: 30 TABLET | Refills: 0 | Status: SHIPPED | OUTPATIENT
Start: 2025-08-11

## 2025-09-02 ENCOUNTER — MYC MEDICAL ADVICE (OUTPATIENT)
Dept: FAMILY MEDICINE | Facility: CLINIC | Age: 40
End: 2025-09-02
Payer: COMMERCIAL

## 2025-09-02 DIAGNOSIS — R53.82 CHRONIC FATIGUE: Primary | ICD-10-CM

## 2025-09-02 RX ORDER — METHYLPHENIDATE HYDROCHLORIDE 27 MG/1
27 TABLET ORAL EVERY MORNING
Qty: 30 TABLET | Refills: 0 | Status: SHIPPED | OUTPATIENT
Start: 2025-09-02